# Patient Record
Sex: FEMALE | Race: WHITE | NOT HISPANIC OR LATINO | Employment: FULL TIME | URBAN - NONMETROPOLITAN AREA
[De-identification: names, ages, dates, MRNs, and addresses within clinical notes are randomized per-mention and may not be internally consistent; named-entity substitution may affect disease eponyms.]

---

## 2018-06-08 ENCOUNTER — OFFICE VISIT CONVERTED (OUTPATIENT)
Dept: FAMILY MEDICINE CLINIC | Age: 43
End: 2018-06-08
Attending: NURSE PRACTITIONER

## 2018-09-12 ENCOUNTER — OFFICE VISIT CONVERTED (OUTPATIENT)
Dept: FAMILY MEDICINE CLINIC | Age: 43
End: 2018-09-12
Attending: NURSE PRACTITIONER

## 2019-03-13 ENCOUNTER — OFFICE VISIT CONVERTED (OUTPATIENT)
Dept: FAMILY MEDICINE CLINIC | Age: 44
End: 2019-03-13
Attending: NURSE PRACTITIONER

## 2019-03-20 ENCOUNTER — HOSPITAL ENCOUNTER (OUTPATIENT)
Dept: OTHER | Facility: HOSPITAL | Age: 44
Discharge: HOME | End: 2019-03-20
Attending: NURSE PRACTITIONER

## 2019-03-20 LAB
25(OH)D3 SERPL-MCNC: 37.7 NG/ML (ref 30–100)
ALBUMIN SERPL-MCNC: 4.5 G/DL (ref 3.5–5)
ALBUMIN/GLOB SERPL: 1.2 {RATIO} (ref 1.4–2.6)
ALP SERPL-CCNC: 61 U/L (ref 42–98)
ALT SERPL-CCNC: 26 U/L (ref 10–40)
ANION GAP SERPL CALC-SCNC: 13 MMOL/L (ref 8–19)
AST SERPL-CCNC: 25 U/L (ref 15–50)
BILIRUB SERPL-MCNC: 1.14 MG/DL (ref 0.2–1.3)
BUN SERPL-MCNC: 14 MG/DL (ref 5–25)
BUN/CREAT SERPL: 17 {RATIO} (ref 6–20)
CALCIUM SERPL-MCNC: 9.9 MG/DL (ref 8.7–10.4)
CHLORIDE SERPL-SCNC: 100 MMOL/L (ref 99–111)
CHOLEST SERPL-MCNC: 159 MG/DL (ref 107–200)
CHOLEST/HDLC SERPL: 2.9 {RATIO} (ref 3–6)
CONV CO2: 27 MMOL/L (ref 22–32)
CONV TOTAL PROTEIN: 8.2 G/DL (ref 6.3–8.2)
CREAT UR-MCNC: 0.82 MG/DL (ref 0.5–0.9)
GFR SERPLBLD BASED ON 1.73 SQ M-ARVRAT: >60 ML/MIN/{1.73_M2}
GLOBULIN UR ELPH-MCNC: 3.7 G/DL (ref 2–3.5)
GLUCOSE SERPL-MCNC: 99 MG/DL (ref 65–99)
HDLC SERPL-MCNC: 54 MG/DL (ref 40–60)
LDLC SERPL CALC-MCNC: 90 MG/DL (ref 70–100)
OSMOLALITY SERPL CALC.SUM OF ELEC: 283 MOSM/KG (ref 273–304)
POTASSIUM SERPL-SCNC: 3.9 MMOL/L (ref 3.5–5.3)
SODIUM SERPL-SCNC: 136 MMOL/L (ref 135–147)
TRIGL SERPL-MCNC: 77 MG/DL (ref 40–150)
VLDLC SERPL-MCNC: 15 MG/DL (ref 5–37)

## 2019-05-15 ENCOUNTER — OFFICE VISIT CONVERTED (OUTPATIENT)
Dept: FAMILY MEDICINE CLINIC | Age: 44
End: 2019-05-15
Attending: NURSE PRACTITIONER

## 2019-05-15 ENCOUNTER — HOSPITAL ENCOUNTER (OUTPATIENT)
Dept: OTHER | Facility: HOSPITAL | Age: 44
Discharge: HOME | End: 2019-05-15
Attending: NURSE PRACTITIONER

## 2019-05-17 LAB
CONV LAST MENSTURAL PERIOD: NORMAL
HPV HYBRID CAPTURE HIGH RISK: NEGATIVE
SPECIMEN SOURCE: NORMAL
SPECIMEN SOURCE: NORMAL
THIN PREP CVX: NORMAL

## 2019-10-02 ENCOUNTER — OFFICE VISIT CONVERTED (OUTPATIENT)
Dept: FAMILY MEDICINE CLINIC | Age: 44
End: 2019-10-02
Attending: NURSE PRACTITIONER

## 2019-10-23 ENCOUNTER — HOSPITAL ENCOUNTER (OUTPATIENT)
Dept: OTHER | Facility: HOSPITAL | Age: 44
Discharge: HOME | End: 2019-10-23
Attending: NURSE PRACTITIONER

## 2019-10-23 LAB
ALBUMIN SERPL-MCNC: 4.7 G/DL (ref 3.5–5)
ALBUMIN/GLOB SERPL: 1.4 {RATIO} (ref 1.4–2.6)
ALP SERPL-CCNC: 63 U/L (ref 42–98)
ALT SERPL-CCNC: 27 U/L (ref 10–40)
ANION GAP SERPL CALC-SCNC: 21 MMOL/L (ref 8–19)
AST SERPL-CCNC: 25 U/L (ref 15–50)
BILIRUB SERPL-MCNC: 1.03 MG/DL (ref 0.2–1.3)
BUN SERPL-MCNC: 18 MG/DL (ref 5–25)
BUN/CREAT SERPL: 21 {RATIO} (ref 6–20)
CALCIUM SERPL-MCNC: 9.8 MG/DL (ref 8.7–10.4)
CHLORIDE SERPL-SCNC: 100 MMOL/L (ref 99–111)
CHOLEST SERPL-MCNC: 167 MG/DL (ref 107–200)
CHOLEST/HDLC SERPL: 2.8 {RATIO} (ref 3–6)
CONV CO2: 22 MMOL/L (ref 22–32)
CONV TOTAL PROTEIN: 8.1 G/DL (ref 6.3–8.2)
CREAT UR-MCNC: 0.84 MG/DL (ref 0.5–0.9)
GFR SERPLBLD BASED ON 1.73 SQ M-ARVRAT: >60 ML/MIN/{1.73_M2}
GLOBULIN UR ELPH-MCNC: 3.4 G/DL (ref 2–3.5)
GLUCOSE SERPL-MCNC: 96 MG/DL (ref 65–99)
HDLC SERPL-MCNC: 60 MG/DL (ref 40–60)
LDLC SERPL CALC-MCNC: 85 MG/DL (ref 70–100)
OSMOLALITY SERPL CALC.SUM OF ELEC: 290 MOSM/KG (ref 273–304)
POTASSIUM SERPL-SCNC: 3.6 MMOL/L (ref 3.5–5.3)
SODIUM SERPL-SCNC: 139 MMOL/L (ref 135–147)
TRIGL SERPL-MCNC: 111 MG/DL (ref 40–150)
VLDLC SERPL-MCNC: 22 MG/DL (ref 5–37)

## 2020-03-11 ENCOUNTER — OFFICE VISIT CONVERTED (OUTPATIENT)
Dept: FAMILY MEDICINE CLINIC | Age: 45
End: 2020-03-11
Attending: NURSE PRACTITIONER

## 2020-09-09 ENCOUNTER — OFFICE VISIT CONVERTED (OUTPATIENT)
Dept: FAMILY MEDICINE CLINIC | Age: 45
End: 2020-09-09
Attending: NURSE PRACTITIONER

## 2020-09-09 ENCOUNTER — HOSPITAL ENCOUNTER (OUTPATIENT)
Dept: OTHER | Facility: HOSPITAL | Age: 45
Discharge: HOME | End: 2020-09-09
Attending: NURSE PRACTITIONER

## 2020-09-09 LAB
ALBUMIN SERPL-MCNC: 4.7 G/DL (ref 3.5–5)
ALBUMIN/GLOB SERPL: 1.3 {RATIO} (ref 1.4–2.6)
ALP SERPL-CCNC: 60 U/L (ref 42–98)
ALT SERPL-CCNC: 20 U/L (ref 10–40)
ANION GAP SERPL CALC-SCNC: 18 MMOL/L (ref 8–19)
AST SERPL-CCNC: 22 U/L (ref 15–50)
BASOPHILS # BLD MANUAL: 0.04 10*3/UL (ref 0–0.2)
BASOPHILS NFR BLD MANUAL: 0.6 % (ref 0–3)
BILIRUB SERPL-MCNC: 0.86 MG/DL (ref 0.2–1.3)
BUN SERPL-MCNC: 14 MG/DL (ref 5–25)
BUN/CREAT SERPL: 17 {RATIO} (ref 6–20)
CALCIUM SERPL-MCNC: 10 MG/DL (ref 8.7–10.4)
CHLORIDE SERPL-SCNC: 98 MMOL/L (ref 99–111)
CHOLEST SERPL-MCNC: 178 MG/DL (ref 107–200)
CHOLEST/HDLC SERPL: 3.9 {RATIO} (ref 3–6)
CONV CO2: 24 MMOL/L (ref 22–32)
CONV TOTAL PROTEIN: 8.2 G/DL (ref 6.3–8.2)
CREAT UR-MCNC: 0.82 MG/DL (ref 0.5–0.9)
DEPRECATED RDW RBC AUTO: 42.1 FL
EOSINOPHIL # BLD MANUAL: 0.07 10*3/UL (ref 0–0.7)
EOSINOPHIL NFR BLD MANUAL: 1 % (ref 0–7)
ERYTHROCYTE [DISTWIDTH] IN BLOOD BY AUTOMATED COUNT: 12.7 % (ref 11.5–14.5)
GFR SERPLBLD BASED ON 1.73 SQ M-ARVRAT: >60 ML/MIN/{1.73_M2}
GLOBULIN UR ELPH-MCNC: 3.5 G/DL (ref 2–3.5)
GLUCOSE SERPL-MCNC: 103 MG/DL (ref 65–99)
GRANS (ABSOLUTE): 4.84 10*3/UL (ref 2–8)
GRANS: 71.2 % (ref 30–85)
HBA1C MFR BLD: 15.5 G/DL (ref 12–16)
HCT VFR BLD AUTO: 45.3 % (ref 37–47)
HDLC SERPL-MCNC: 46 MG/DL (ref 40–60)
IMM GRANULOCYTES # BLD: 0.01 10*3/UL (ref 0–0.54)
IMM GRANULOCYTES NFR BLD: 0.1 % (ref 0–0.43)
LDLC SERPL CALC-MCNC: 111 MG/DL (ref 70–100)
LYMPHOCYTES # BLD MANUAL: 1.5 10*3/UL (ref 1–5)
LYMPHOCYTES NFR BLD MANUAL: 5 % (ref 3–10)
MCH RBC QN AUTO: 30.8 PG (ref 27–31)
MCHC RBC AUTO-ENTMCNC: 34.2 G/DL (ref 33–37)
MCV RBC AUTO: 89.9 FL (ref 81–99)
MONOCYTES # BLD AUTO: 0.34 10*3/UL (ref 0.2–1.2)
OSMOLALITY SERPL CALC.SUM OF ELEC: 283 MOSM/KG (ref 273–304)
PLATELET # BLD AUTO: 261 10*3/UL (ref 130–400)
PMV BLD AUTO: 10.5 FL (ref 7.4–10.4)
POTASSIUM SERPL-SCNC: 4.1 MMOL/L (ref 3.5–5.3)
RBC # BLD AUTO: 5.04 10*6/UL (ref 4.2–5.4)
SODIUM SERPL-SCNC: 136 MMOL/L (ref 135–147)
TRIGL SERPL-MCNC: 106 MG/DL (ref 40–150)
TSH SERPL-ACNC: 0.96 M[IU]/L (ref 0.27–4.2)
VARIANT LYMPHS NFR BLD MANUAL: 22.1 % (ref 20–45)
VLDLC SERPL-MCNC: 21 MG/DL (ref 5–37)
WBC # BLD AUTO: 6.8 10*3/UL (ref 4.8–10.8)

## 2020-09-10 LAB — 25(OH)D3 SERPL-MCNC: 57.4 NG/ML (ref 30–100)

## 2020-10-21 ENCOUNTER — HOSPITAL ENCOUNTER (OUTPATIENT)
Dept: OTHER | Facility: HOSPITAL | Age: 45
Discharge: HOME | End: 2020-10-21

## 2020-11-20 ENCOUNTER — OFFICE VISIT CONVERTED (OUTPATIENT)
Dept: FAMILY MEDICINE CLINIC | Age: 45
End: 2020-11-20
Attending: NURSE PRACTITIONER

## 2020-12-16 ENCOUNTER — OFFICE VISIT CONVERTED (OUTPATIENT)
Dept: FAMILY MEDICINE CLINIC | Age: 45
End: 2020-12-16
Attending: NURSE PRACTITIONER

## 2021-05-18 NOTE — PROGRESS NOTES
"Carlota Saez  1975     Office/Outpatient Visit    Visit Date: Wed, Dec 16, 2020 11:29 am    Provider: Nydia Rodriguez N.P. (Assistant: Monik Garcia MA)    Location: [facility]        Electronically signed by Nydia Rodriguez N.P. on  12/16/2020 01:39:12 PM                             Subjective:        CC: Ms. Saez is a 45 year old White female.  Patient presents today with complaints of rash on bilateral arms X \"awhile\";         HPI:           Ms. Saez presents with rash and other nonspecific skin eruption.  televideo visit one month ago for rash that she suspected could have been MRSA.  completed doxycycline with minimal improvement.  rash itches and burns mildly.  located on bilateral extremties.  denies discharge or boils.  no new soaps or detergents.  is using scent booster in laundry.   does not have any rash.  afebrile.  denies joint pain.      ROS:     CONSTITUTIONAL:  Negative for chills, fatigue, fever, and weight change.      E/N/T:  Negative for hearing problems, E/N/T pain, congestion, rhinorrhea, epistaxis, hoarseness, and dental problems.      CARDIOVASCULAR:  Negative for chest pain, palpitations, tachycardia, orthopnea, and edema.      RESPIRATORY:  Negative for cough, dyspnea, and hemoptysis.      GASTROINTESTINAL:  Negative for abdominal pain, heartburn, constipation, diarrhea, and stool changes.      MUSCULOSKELETAL:  Negative for arthralgias, back pain, and myalgias.      INTEGUMENTARY/BREAST:  Positive for rash.      NEUROLOGICAL:  Negative for dizziness, headaches, paresthesias, and weakness.      ALLERGIC/IMMUNOLOGIC:  Negative for seasonal allergies.      PSYCHIATRIC:  Negative for anxiety, depression, and sleep disturbances.          Past Medical History / Family History / Social History:         Last Reviewed on 12/16/2020 11:38 AM by Nydia Rodriguez    Past Medical History:                 PAST MEDICAL HISTORY         Hypertension     titanium " clip in left breast, biopsy normal in the past         GYNECOLOGICAL HISTORY:        Problems with pregnancy have included dehydration.          CURRENT MEDICAL PROVIDERS:    Obstetrician/Gynecologist: Dr. Finnegan         PREVENTIVE HEALTH MAINTENANCE             BONE DENSITY: was last done 14 with normal results     MAMMOGRAM: Done within last 2 years and results in are chart was last done 10/21/2020 with normal results     PAP SMEAR: was last done 5/15/19 with normal results HPV normal         Surgical History:         oral surgeries;      section: X 1;         Family History:     Father: Medical history unknown     Mother: Cause of death was lung CA     Maternal great grandmother - Diabetes         Social History:     Occupation: [employer] in [employer]     Marital Status:      Children: 1 child         Tobacco/Alcohol/Supplements:     Last Reviewed on 2020 11:31 AM by Monik Garcia    Tobacco: Current Smoker: She currently smokes every day, 1 pack per day.          Substance Abuse History:     Last Reviewed on 2018 12:27 PM by Opal Paredes    None         Mental Health History:     Last Reviewed on 2018 12:27 PM by Opal Paredes    NEGATIVE         Communicable Diseases (eg STDs):     Last Reviewed on 2018 12:27 PM by Opal Paredes    Reportable health conditions; NEGATIVE         Current Problems:     Last Reviewed on 2020 11:37 AM by Nydia Rodriguez    Vitamin D deficiency, unspecified    Essential (primary) hypertension    Hyperlipidemia, unspecified    Obesity, unspecified    Nicotine dependence, unspecified, uncomplicated    Rash and other nonspecific skin eruption        Immunizations:     Tdap (Tetanus, reduced diph, acellular pertussis) 2018        Allergies:     Last Reviewed on 2020 11:31 AM by Monik Garcia    Penicillins: Unknown Reactions         Current Medications:     Last Reviewed on 2020 11:31 AM by  Monik Garcia    lisinopriL-hydrochlorothiazide 20-25 mg oral tablet [TAKE 1 TABLET(S) BY MOUTH DAILY]    atorvastatin 10 mg oral tablet [1 tab daily]    cholecalciferol (vitamin D3) 100 mcg (4,000 unit) oral capsule [Take 1 capsule(s) by mouth daily]    OTC Calcium 600mg w/ Vitamin D3 800IU Take one tablet bid      mupirocin 2 % Topical Ointment [apply a small amount to the affected area by topical route 2 times per day]    DOXYCYCLINE HYCLATE 100 MG CAP        Objective:        Vitals:         Historical:     9/9/2020  BP:   130/74 mm Hg ( (left arm, , sitting, );) 9/9/2020  Wt:   272.4lbs    Current: 12/16/2020 11:33:03 AM    Ht:  5 ft, 4 in;  Wt: 269 lbs;  BMI: 46.2T: 97 F (temporal);  BP: 144/81 mm Hg (left arm, sitting);  P: 68 bpm (left arm (BP Cuff), sitting);  sCr: 0.82 mg/dL;  GFR: 108.66        Exams:     PHYSICAL EXAM:     GENERAL: obese;  no apparent distress;     E/N/T: EARS: bilateral TMs are normal;  OROPHARYNX: posterior pharynx, including tonsils, tongue, and uvula are normal;     RESPIRATORY: normal respiratory rate and pattern with no distress; normal breath sounds with no rales, rhonchi, wheezes or rubs;     CARDIOVASCULAR: normal rate; rhythm is regular;     BREAST/INTEGUMENT: a rash is noted on the upper extremities;  the color is mainly red;  it is best characterized as maculopapular;     MUSCULOSKELETAL:  Normal range of motion, strength and tone;     NEUROLOGIC: mental status: alert and oriented x 3; GROSSLY INTACT     PSYCHIATRIC:  appropriate affect and demeanor; normal speech pattern; grossly normal memory;         Assessment:         R21   Rash and other nonspecific skin eruption           ORDERS:         Meds Prescribed:       [New Rx] predniSONE 5 mg oral tablet [take 8 tablets on day one, 6 tablets on day 2, 4 tablets on day 3, 2 tablets on day 4 and 1 tablet on day 5], #21 (twenty one) tablets, Refills: 0 (zero)                 Plan:         Rash and other nonspecific skin  "eruption        RECOMMENDATIONS given include: avoid irritants (such as wool clothing, synthetics, etc.) and stop use of scent booster in laundry.  take zyrtec daily until rash resolves.  consider referral to dermatology if not improving..            Prescriptions:       [New Rx] predniSONE 5 mg oral tablet [take 8 tablets on day one, 6 tablets on day 2, 4 tablets on day 3, 2 tablets on day 4 and 1 tablet on day 5], #21 (twenty one) tablets, Refills: 0 (zero)             Patient Recommendations:        For  Rash and other nonspecific skin eruption:        Avoid irritants such as wool clothing or synthetics; some \"no iron\" garments contain formaldehyde residue which may aggravate skin irritation.              Charge Capture:         Primary Diagnosis:     R21  Rash and other nonspecific skin eruption           Orders:      42258  Office/outpatient visit; established patient, level 3  (In-House)                     "

## 2021-05-18 NOTE — PROGRESS NOTES
"Carlota Saez  1975     Office/Outpatient Visit    Visit Date: Wed, Sep 9, 2020 11:54 am    Provider: Opal Paredes N.P. (Assistant: Monik Garcia MA)    Location: [facility]        Electronically signed by Opal Paredes N.P. on  2020 12:18:57 PM                             Subjective:        CC:     HPI: Patient to be evaluated for hypertension.  Her current cardiac medication regimen includes Lisinopril/HCTZ.  Ms. Saez does not check her blood pressure other than at her clinic appointments.  Compliance with treatment has been good; she takes her medication as directed and follows up as directed.          Dx with hyperlipidemia; current treatment includes Atorvastatin.          Hx Vitamin D deficiency. Taking supplement daily. Noticed improvement in fatigue since she started.    ROS:     CONSTITUTIONAL:  Negative for chills and fever.      EYES:  Negative for blurred vision and eye pain.      CARDIOVASCULAR:  Negative for chest pain and palpitations.      RESPIRATORY:  Negative for dyspnea and frequent wheezing.      NEUROLOGICAL:  Negative for dizziness and headaches.      PSYCHIATRIC:  Negative for depression and suicidal thoughts.          Past Medical History / Family History / Social History:         Last Reviewed on 3/11/2020 12:28 PM by Opal Paredes    Past Medical History:                 PAST MEDICAL HISTORY         Hypertension     titanium clip in left breast, biopsy normal in the past         GYNECOLOGICAL HISTORY:        Problems with pregnancy have included dehydration.          CURRENT MEDICAL PROVIDERS:    Obstetrician/Gynecologist: Dr. Finnegan         PREVENTIVE HEALTH MAINTENANCE             BONE DENSITY: was last done 14 with normal results     DENTAL CLEANING: was last done      EYE EXAM: was last done 'couple of years ago\"     MAMMOGRAM: Done within last 2 years and results in are chart was last done 19 with normal results     PAP SMEAR: " was last done 5/15/19 with normal results HPV normal         Surgical History:         oral surgeries;      section: X 1;         Family History:     Father: Medical history unknown     Mother: Cause of death was lung CA     Maternal great grandmother - Diabetes         Social History:     Occupation: [employer] in [employer]     Marital Status:      Children: 1 child         Tobacco/Alcohol/Supplements:     Last Reviewed on 3/11/2020 12:17 PM by Rosita Moss    Tobacco: Current Smoker: She currently smokes every day, E-Cigarette.          Substance Abuse History:     Last Reviewed on 2018 12:27 PM by Opal Paredes    None         Mental Health History:     Last Reviewed on 2018 12:27 PM by Opal Paredes    NEGATIVE         Communicable Diseases (eg STDs):     Last Reviewed on 2018 12:27 PM by Opal Paredes    Reportable health conditions; NEGATIVE         Current Problems:     Last Reviewed on 2018 12:27 PM by Opal Paredes    Vitamin D deficiency, unspecified    Essential (primary) hypertension    Hyperlipidemia, unspecified    Obesity, unspecified    Nicotine dependence, unspecified, uncomplicated    Encounter for general adult medical examination without abnormal findings    Encounter for screening for depression    Encounter for screening mammogram for malignant neoplasm of breast        Immunizations:     Tdap (Tetanus, reduced diph, acellular pertussis) 2018        Allergies:     Last Reviewed on 2020 11:56 AM by Monik Garcia    Penicillins: Unknown Reactions         Current Medications:     Last Reviewed on 2020 11:56 AM by Monik Garcia    lisinopriL-hydrochlorothiazide 20-25 mg oral tablet [TAKE 1 TABLET(S) BY MOUTH DAILY]    atorvastatin 10 mg oral tablet [1 tab daily]    cholecalciferol (vitamin D3) 4,000 unit oral capsule [Take 1 capsule(s) by mouth daily]    OTC Calcium 600mg w/ Vitamin D3 800IU Take one tablet bid           Objective:        Vitals:         Historical:     3/11/2020  BP:   137/73 mm Hg ( (left arm, , sitting, );) 3/11/2020  P:   68bpm ( (left arm (BP Cuff), , sitting, );) 3/11/2020  Wt:   283.4lbs    Current: 9/9/2020 11:58:21 AM    Ht:  5 ft, 4 in;  Wt: 272.4 lbs;  BMI: 46.8T: 97.1 F (temporal);  BP: 130/74 mm Hg (left arm, sitting);  P: 73 bpm (left arm (BP Cuff), sitting);  sCr: 0.84 mg/dL;  GFR: 106.64        Exams:     PHYSICAL EXAM:     GENERAL: well developed, well nourished;  no apparent distress;     RESPIRATORY: normal respiratory rate and pattern with no distress; normal breath sounds with no rales, rhonchi, wheezes or rubs;     CARDIOVASCULAR: normal rate; rhythm is regular;     MUSCULOSKELETAL: normal gait;     NEUROLOGIC: mental status: alert and oriented x 3; GROSSLY INTACT     PSYCHIATRIC: appropriate affect and demeanor;         Assessment:         I10   Essential (primary) hypertension       E78.5   Hyperlipidemia, unspecified       E55.9   Vitamin D deficiency, unspecified       Z13.31   Encounter for screening for depression       F17.200   Nicotine dependence, unspecified, uncomplicated           ORDERS:         Meds Prescribed:       [Refilled] lisinopriL-hydrochlorothiazide 20-25 mg oral tablet [TAKE 1 TABLET(S) BY MOUTH DAILY], #90 (ninety) tablets, Refills: 1 (one)       [Refilled] atorvastatin 10 mg oral tablet [1 tab daily], #90 (ninety) tablets, Refills: 1 (one)       [Refilled] cholecalciferol (vitamin D3) 100 mcg (4,000 unit) oral capsule [Take 1 capsule(s) by mouth daily], #90 (ninety) capsules, Refills: 1 (one)         Lab Orders:       APPTO  Appointment need  (In-House)              Other Orders:         Depression screen negative  (In-House)            4004F  Pt scrnd tobacco use rcvd tobacco cessation talk  (In-House)                      Plan: Has mammogram scheduled. Will print out lab work from last visit for her to complete today.        Essential (primary)  hypertensionStable. Continue efforts with healthy diet and exercise.         FOLLOW-UP: Schedule a follow-up visit in 6 months.            Prescriptions:       [Refilled] lisinopriL-hydrochlorothiazide 20-25 mg oral tablet [TAKE 1 TABLET(S) BY MOUTH DAILY], #90 (ninety) tablets, Refills: 1 (one)           Orders:       APPTO  Appointment need  (In-House)              Hyperlipidemia, unspecifiedChecking labs as above          Prescriptions:       [Refilled] atorvastatin 10 mg oral tablet [1 tab daily], #90 (ninety) tablets, Refills: 1 (one)         Vitamin D deficiency, unspecified          Prescriptions:       [Refilled] cholecalciferol (vitamin D3) 100 mcg (4,000 unit) oral capsule [Take 1 capsule(s) by mouth daily], #90 (ninety) capsules, Refills: 1 (one)         Encounter for screening for depression    MIPS PHQ-9 Depression Screening: Completed form scanned and in chart; Total Score 0; Negative Depression Screen           Orders:         Depression screen negative  (In-House)              Nicotine dependence, unspecified, uncomplicated        RECOMMENDATIONS given include: Counseled on smoking cessation and advised of the benefits to patient's health if she were to stop smoking. Counseling for less than 3 minutes.            Orders:       4004F  Pt scrnd tobacco use rcvd tobacco cessation talk  (In-House)                  Patient Recommendations:        For  Essential (primary) hypertension:    Schedule a follow-up visit in 6 months.          For  Nicotine dependence, unspecified, uncomplicated:        Stop smoking.              Charge Capture:         Primary Diagnosis:     I10  Essential (primary) hypertension           Orders:      18157  Office/outpatient visit; established patient, level 4  (In-House)            APPTO  Appointment need  (In-House)              E78.5  Hyperlipidemia, unspecified     E55.9  Vitamin D deficiency, unspecified     Z13.31  Encounter for screening for depression            Orders:        Depression screen negative  (In-House)              F17.200  Nicotine dependence, unspecified, uncomplicated           Orders:      4004F  Pt scrnd tobacco use rcvd tobacco cessation talk  (In-House)

## 2021-05-18 NOTE — PROGRESS NOTES
Carlota Saez 1975     Office/Outpatient Visit    Visit Date: Wed, Sep 12, 2018 12:12 pm    Provider: Opal Paredes N.P. (Assistant: Areli Royal MA)    Location: [facility]        Electronically signed by Opal Paredes N.P. on  2018 01:05:40 PM                             SUBJECTIVE:        CC:     Ms. Saez is a 43 year old White female.  This is a follow-up visit.          HPI:         Ms. Saez presents with hypertension.  Her current cardiac medication regimen includes Lisinopril/HCTZ.  Ms. Saez does not check her blood pressure other than at her clinic appointments.  Compliance with treatment has been good; she takes her medication as directed and follows up as directed.          With regard to the hyperlipidemia, most recent lab tests include LDL:  158 (mg/dL) (2018), Total Cholesterol:  230 (mg/dL) (2018), Triglycerides:  76 (mg/dL) (2018).  Currently, she is taking Lipitor.      She is taking OTC medication with calcium and 800 IU of Vitamin D3. Has noticed improvement in fatigue since starting this.     ROS:     CONSTITUTIONAL:  Negative for chills and fever.      EYES:  Negative for eye drainage and eye pain.      E/N/T:  Negative for ear pain and sore throat.      CARDIOVASCULAR:  Negative for chest pain and palpitations.      RESPIRATORY:  Negative for dyspnea and frequent wheezing.      GASTROINTESTINAL:  Negative for abdominal pain and vomiting.      NEUROLOGICAL:  Negative for dizziness and headaches.      PSYCHIATRIC:  Negative for depression and suicidal thoughts.          PMH/FMH/SH:     Last Reviewed on 2018 12:27 PM by Opal Paredes    Past Medical History:                 PAST MEDICAL HISTORY         Hypertension     titanium clip in left breast, biopsy normal in the past         GYNECOLOGICAL HISTORY:        Problems with pregnancy have included dehydration.          CURRENT MEDICAL PROVIDERS:    Obstetrician/Gynecologist: Dr. Finnegan          PREVENTIVE HEALTH MAINTENANCE             BONE DENSITY: was last done 14 with normal results     EYE EXAM: was last done 2017 Sandip     MAMMOGRAM: Done within last 2 years and results in are chart was last done 17 with normal results     PAP SMEAR: was last done  with normal results         Surgical History:         oral surgeries;       section: X 1;         Family History:     Father: Medical history unknown     Mother: Cause of death was lung CA         Social History:     Occupation: [employer]     Marital Status:      Children: 1 child         Tobacco/Alcohol/Supplements:     Last Reviewed on 2018 12:27 PM by Opal Paredes    Tobacco: Current Smoker: She currently smokes every day, E-Cigarette.   Currently uses smokeless tobacco.          Substance Abuse History:     Last Reviewed on 2018 12:27 PM by Opal Paredes    None         Mental Health History:     Last Reviewed on 2018 12:27 PM by Opal Paredes    NEGATIVE         Communicable Diseases (eg STDs):     Last Reviewed on 2018 12:27 PM by Opal Paredes    Reportable health conditions; NEGATIVE             Current Problems:     Last Reviewed on 2018 12:27 PM by Opal Paredes    Hyperlipidemia     Vitamin D deficiency, unspecified     Hypertension         Immunizations:     None        Allergies:     Last Reviewed on 2018 12:27 PM by Opal Paredes    Penicillins: Unknown Reactions        Current Medications:     Last Reviewed on 2018 12:27 PM by Opal Paredes    Atorvastatin Calcium 10mg Tablet 1 tab daily     Lisinopril/Hydrochlorothiazide 20mg/25mg Tablet Take 1 tablet(s) by mouth daily     OTC Calcium 600mg w/ Vitamin D3 800IU Take one tablet bid         OBJECTIVE:        Vitals:         Historical:     2018  BP:   117/78 mm Hg ( (left arm, , sitting, );)     2018  Wt:   278.8lbs        Current: 2018 12:17:07 PM    Ht:  5 ft, 4 in;  Wt:  283.6 lbs;  BMI: 48.7    T: 98.3 F (oral);  BP: 128/66 mm Hg (left arm, sitting);  P: 65 bpm (left arm (BP Cuff), sitting);  sCr: 0.84 mg/dL;  GFR: 110.73        Exams:     PHYSICAL EXAM:     GENERAL: well developed, well nourished;  no apparent distress;     RESPIRATORY: normal respiratory rate and pattern with no distress; normal breath sounds with no rales, rhonchi, wheezes or rubs;     CARDIOVASCULAR: normal rate; rhythm is regular;     GASTROINTESTINAL: nontender; normal bowel sounds; no organomegaly;     MUSCULOSKELETAL: normal gait;     NEUROLOGIC: mental status: alert and oriented x 3; GROSSLY INTACT     PSYCHIATRIC: appropriate affect and demeanor;         Procedures:     Tdap vaccination     1. Tdap: 0.5 ml unit dose given IM in the left upper arm; administered by AS;  lot number [id number]; expires 10/19/2020             ASSESSMENT           401.1   I10  Hypertension              DDx:     272.4   E78.2  Hyperlipidemia              DDx:     268.9   E55.9  Vitamin D deficiency, unspecified              DDx:     V06.1   Z23  Tdap vaccination              DDx:     305.1   F17.210  Tobacco abuse              DDx:         ORDERS:         Meds Prescribed:       Refill of: Lisinopril/Hydrochlorothiazide 20mg/25mg Tablet Take 1 tablet(s) by mouth daily  #30 (Thirty) tablet(s) Refills: 5       Refill of: Atorvastatin Calcium 10mg Tablet 1 tab daily  #30 (Thirty) tablet(s) Refills: 5         Lab Orders:       04931  HTNLP - [facility] CMP AND LIPID: [id number], [id number]  (Send-Out)         26906  VITD - [facility] Vitamin D, 25 Hydroxy  (Send-Out)         APPTO  Appointment need  (In-House)           Procedures Ordered:       45049  Tdap vaccine, when administered to individuals 7 years or older, for intramuscular use  (In-House)         03104  Immunization administration; one vaccine  (In-House)           Other Orders:       4004F  Pt scrnd tobacco use rcvd tobacco cessation talk  (In-House)                   PLAN: Will follow up with   [facility] office for mammogram and pap smear.          Hypertension     LABORATORY:  Labs ordered to be performed today include HTN/Lipid Panel: CMP, Lipid.      RECOMMENDATIONS given include: keep blood pressure log as directed and eat a low salt diet.      FOLLOW-UP: Schedule a follow-up visit in 6 months.            Prescriptions:       Refill of: Lisinopril/Hydrochlorothiazide 20mg/25mg Tablet Take 1 tablet(s) by mouth daily  #30 (Thirty) tablet(s) Refills: 5           Orders:       53949  HTNLP - [facility] CMP AND LIPID: [id number], [id number]  (Send-Out)         APPTO  Appointment need  (In-House)             Patient Education Handouts:       DASH Diet        High Blood Pressure (HTN)           Hyperlipidemia         RECOMMENDATIONS given include: Further recommendation to be given after test results are complete.            Prescriptions:       Refill of: Atorvastatin Calcium 10mg Tablet 1 tab daily  #30 (Thirty) tablet(s) Refills: 5           Patient Education Handouts:       Hyperlipidemia (Hyperlipoproteinemia)           Vitamin D deficiency, unspecified Continue OTC supplement. Checking labs today.     LABORATORY:  Labs ordered to be performed today include Vitamin D.            Orders:       75209  VITD - [facility] Vitamin D, 25 Hydroxy  (Send-Out)            Tdap vaccination         IMMUNIZATIONS given today: TDAP.            Orders:       83202  Tdap vaccine, when administered to individuals 7 years or older, for intramuscular use  (In-House)         66872  Immunization administration; one vaccine  (In-House)            Tobacco abuse         RECOMMENDATIONS given include: Counseled on smoking cessation and advised of the benefits to patient's health if she were to stop smoking. Counseling for less than 3 minutes.            Orders:       4004F  Pt scrnd tobacco use rcvd tobacco cessation talk  (In-House)               Patient Recommendations:        For  Hypertension:     Keep a daily blood pressure log and report  elevated blood pressure to provider as directed.  Schedule a follow-up visit in 6 months.          For  Tobacco abuse:         Stop smoking.              CHARGE CAPTURE           **Please note: ICD descriptions below are intended for billing purposes only and may not represent clinical diagnoses**        Primary Diagnosis:         401.1 Hypertension            I10    Essential (primary) hypertension              Orders:          91819   Office/outpatient visit; established patient, level 4  (In-House)             APPTO   Appointment need  (In-House)           272.4 Hyperlipidemia            E78.2    Mixed hyperlipidemia    268.9 Vitamin D deficiency, unspecified            E55.9    Vitamin D deficiency, unspecified    V06.1 Tdap vaccination            Z23    Encounter for immunization              Orders:          42808   Tdap vaccine, when administered to individuals 7 years or older, for intramuscular use  (In-House)             88563   Immunization administration; one vaccine  (In-House)           305.1 Tobacco abuse            F17.210    Nicotine dependence, cigarettes, uncomplicated              Orders:          4004F   Pt scrnd tobacco use rcvd tobacco cessation talk  (In-House)

## 2021-05-18 NOTE — PROGRESS NOTES
Carlota Saez 1975     Office/Outpatient Visit    Visit Date: Wed, Mar 13, 2019 12:15 pm    Provider: Opal Paredes N.P. (Assistant: Monik Garcia MA)    Location: [facility]        Electronically signed by Opal Paredes N.P. on  2019 12:51:59 PM                             SUBJECTIVE:        CC:     Ms. Saez is a 43 year old White female.  This is a follow-up visit.  Patient presents today for six month follow up;         HPI:         Patient to be evaluated for hypertension.  Her current cardiac medication regimen includes Lisinopril/HCTZ.  Ms. Saez does not check her blood pressure other than at her clinic appointments.  Has not taken medication yet today.          Dx with hyperlipidemia; current treatment includes Lipitor.  Most recent lab tests include HDL:  54 (mg/dL) (2018), LDL:  86 (mg/dL) (2018), Total Cholesterol:  166 (mg/dL) (2018), Triglycerides:  129 (mg/dL) (2018).      Hx Vitamin D deficiency. Taking supplement daily. Has noticed improvement in fatigue level since started taking.     ROS:     CONSTITUTIONAL:  Negative for chills and fever.      CARDIOVASCULAR:  Negative for chest pain and palpitations.      RESPIRATORY:  Negative for dyspnea and frequent wheezing.      GASTROINTESTINAL:  Negative for abdominal pain and vomiting.      NEUROLOGICAL:  Negative for dizziness and headaches.      PSYCHIATRIC:  Negative for depression and suicidal thoughts.          PMH/FMH/SH:     Last Reviewed on 2018 12:27 PM by Opal Paredes    Past Medical History:                 PAST MEDICAL HISTORY         Hypertension     titanium clip in left breast, biopsy normal in the past         GYNECOLOGICAL HISTORY:        Problems with pregnancy have included dehydration.          CURRENT MEDICAL PROVIDERS:    Obstetrician/Gynecologist: Dr. Finnegan         PREVENTIVE HEALTH MAINTENANCE             BONE DENSITY: was last done 14 with normal results      EYE EXAM: was last done  Sandip     MAMMOGRAM: Done within last 2 years and results in are chart was last done 17 with normal results     PAP SMEAR: was last done  with normal results         Surgical History:         oral surgeries;       section: X 1;         Family History:     Father: Medical history unknown     Mother: Cause of death was lung CA         Social History:     Occupation: [employer] in [place]     Marital Status:      Children: 1 child         Tobacco/Alcohol/Supplements:     Last Reviewed on 2018 12:27 PM by Opal Paredes    Tobacco: Current Smoker: She currently smokes every day, E-Cigarette.   Currently uses smokeless tobacco.          Substance Abuse History:     Last Reviewed on 2018 12:27 PM by Opal Paredes    None         Mental Health History:     Last Reviewed on 2018 12:27 PM by Opal Paredes    NEGATIVE         Communicable Diseases (eg STDs):     Last Reviewed on 2018 12:27 PM by Opal Paredes    Reportable health conditions; NEGATIVE             Current Problems:     Last Reviewed on 2018 12:27 PM by Opal Paredes    Hyperlipidemia     Vitamin D deficiency, unspecified     Hypertension         Immunizations:     Tdap (Tetanus, reduced diph, acellular pertussis) 2018         Allergies:     Last Reviewed on 2018 12:27 PM by Opal Paredse    Penicillins: Unknown Reactions        Current Medications:     Last Reviewed on 2018 12:27 PM by Opal Paredes    Vitamin D3 2,000IU Capsules 1 capsule daily     Atorvastatin Calcium 10mg Tablet 1 tab daily     Lisinopril/Hydrochlorothiazide 20mg/25mg Tablet Take 1 tablet(s) by mouth daily     OTC Calcium 600mg w/ Vitamin D3 800IU Take one tablet bid         OBJECTIVE:        Vitals:         Historical:     2018  Wt:   283.6lbs        Current: 3/13/2019 12:19:20 PM    Ht:  5 ft, 4 in;  Wt: 291.8 lbs;  BMI: 50.1    T: 97.6 F (oral);  BP: 136/72 mm Hg (left arm,  sitting);  P: 68 bpm (left arm (BP Cuff), sitting);  sCr: 0.98 mg/dL;  GFR: 96.07        Exams:     PHYSICAL EXAM:     GENERAL: well developed, well nourished;  no apparent distress;     RESPIRATORY: normal respiratory rate and pattern with no distress; normal breath sounds with no rales, rhonchi, wheezes or rubs;     CARDIOVASCULAR: normal rate; rhythm is regular;     MUSCULOSKELETAL: normal gait;     NEUROLOGIC: mental status: alert and oriented x 3; GROSSLY INTACT     PSYCHIATRIC: appropriate affect and demeanor;         ASSESSMENT           401.1   I10  Hypertension              DDx:     272.4   E78.5  Hyperlipidemia              DDx:     268.9   E55.9  Vitamin D deficiency, unspecified              DDx:     V76.10   Z12.39  Screening for breast cancer, unspecified              DDx:     278.00   E66.9  Obesity, unspecified              DDx:     305.1   F17.200  Cigarette smoking              DDx:         ORDERS:         Meds Prescribed:       Refill of: Lisinopril/Hydrochlorothiazide 20mg/25mg Tablet Take 1 tablet(s) by mouth daily  #30 (Thirty) tablet(s) Refills: 5       Refill of: Atorvastatin Calcium 10mg Tablet 1 tab daily  #30 (Thirty) tablet(s) Refills: 5       Refill of: Vitamin D3 (Cholecalciferol Vitamin D3) 2,000IU Capsules 1 capsule daily  #30 (Thirty) capsule(s) Refills: 5         Radiology/Test Orders:       [id number]  Screening mammography bi 2-view inc CAD  (Send-Out)           Lab Orders:       [id number]  VITD - H Vitamin D, 25 Hydroxy  (Send-Out)         03192  HTNLP - H CMP AND LIPID: [id number], [id number]  (Send-Out)         APPTO  Appointment need  (In-House)           Other Orders:         Calculated BMI above the upper parameter and a follow-up plan was documented in the medical record  (In-House)         4004F  Pt scrnd tobacco use rcvd tobacco cessation talk  (In-House)                   PLAN: Due for mammogram and pap smear and eye exam.          Hypertension     LABORATORY:  Labs ordered to be  performed today include HTN/Lipid Panel: CMP, Lipid.      RECOMMENDATIONS given include: keep blood pressure log as directed and eat a low salt diet.  MIPS Vaccines Flu and Pneumonia updated in Shot record     FOLLOW-UP: Schedule follow-up appointments on a p.r.n. basis. Schedule a follow-up visit in 6 months. for Well Woman Exam           Prescriptions:       Refill of: Lisinopril/Hydrochlorothiazide 20mg/25mg Tablet Take 1 tablet(s) by mouth daily  #30 (Thirty) tablet(s) Refills: 5           Orders:       [id number]  HTNLP - [facility] CMP AND LIPID: [id number], [id number]  (Send-Out)         APPTO  Appointment need  (In-House)            Hyperlipidemia Low cholesterol diet. Regular exercise.           Prescriptions:       Refill of: Atorvastatin Calcium 10mg Tablet 1 tab daily  #30 (Thirty) tablet(s) Refills: 5          Vitamin D deficiency, unspecified     LABORATORY:  Labs ordered to be performed today include Vitamin D.      RECOMMENDATIONS given include: Further recommendation to be given after test results are complete.            Prescriptions:       Refill of: Vitamin D3 (Cholecalciferol Vitamin D3) 2,000IU Capsules 1 capsule daily  #30 (Thirty) capsule(s) Refills: 5           Orders:       33036  VITD - [facility] Vitamin D, 25 Hydroxy  (Send-Out)            Screening for breast cancer, unspecified         RADIOLOGY:  I have ordered screening mammogram to be done today.            Orders:       [id number]  Screening mammography bi 2-view inc CAD  (Send-Out)            Obesity, unspecified     MIPS     BMI Elevated - Follow-Up Plan: She was provided education on weight loss strategies           Orders:         Calculated BMI above the upper parameter and a follow-up plan was documented in the medical record  (In-House)            Cigarette smoking         RECOMMENDATIONS given include: Counseled on smoking cessation and advised of the benefits to patient's health if she were to stop smoking. Counseling for less than 3 minutes.             Orders:       4004F  Pt scrnd tobacco use rcvd tobacco cessation talk  (In-House)               Patient Recommendations:        For  Hypertension:     Keep a daily blood pressure log and report elevated blood pressure to provider as directed.  Schedule follow-up appointments as needed. Schedule a follow-up visit in 6 months.          For  Cigarette smoking:         Stop smoking.              CHARGE CAPTURE           **Please note: ICD descriptions below are intended for billing purposes only and may not represent clinical diagnoses**        Primary Diagnosis:         401.1 Hypertension            I10    Essential (primary) hypertension              Orders:          90078   Office/outpatient visit; established patient, level 4  (In-House)             APPTO   Appointment need  (In-House)           272.4 Hyperlipidemia            E78.5    Hyperlipidemia, unspecified    268.9 Vitamin D deficiency, unspecified            E55.9    Vitamin D deficiency, unspecified    V76.10 Screening for breast cancer, unspecified            Z12.39    Encounter for other screening for malignant neoplasm of breast    278.00 Obesity, unspecified            E66.9    Obesity, unspecified              Orders:             Calculated BMI above the upper parameter and a follow-up plan was documented in the medical record  (In-House)           305.1 Cigarette smoking            F17.200    Nicotine dependence, unspecified, uncomplicated              Orders:          4004F   Pt scrnd tobacco use rcvd tobacco cessation talk  (In-House)

## 2021-05-18 NOTE — PROGRESS NOTES
Carlota Saez  1975     Office/Outpatient Visit    Visit Date: Wed, Mar 11, 2020 12:14 pm    Provider: Opal Paredes N.P. (Assistant: Rosita Moss RN)    Location: [facility]        Electronically signed by Opal Paredes N.P. on  03/11/2020 12:55:57 PM                             Subjective:        CC: Ms. Saez is a 44 year old White female.  Preventative Exam;         HPI:           Patient to be evaluated for encounter for general adult medical examination without abnormal findings.  She is menopausal.   Her last Pap smear was in 5/15/19 and was normal.   Her last mammogram was in 4/24/19 and was normal.   Her last DEXA was in 11/24/14 and was normal.   Preventative Health updated today.  She is current with her Td.  She is not current with influenza immunization.  Tobacco: Current Smoker: She currently smokes every day, E-Cigarette.  Patient to be evaluated for hypertension.  Her current cardiac medication regimen includes Lisinopril/HCTZ.  Ms. Saez does not check her blood pressure other than at her clinic appointments.  Compliance with treatment has been good; she takes her medication as directed and follows up as directed.          Dx with hyperlipidemia; current treatment includes Atorvastatin.          Hx Vitamin D deficiency. Taking supplement daily. Has noticed improvement in fatigue level since started taking.    ROS:     CONSTITUTIONAL:  Negative for chills and fever.      EYES:  Negative for eye drainage and eye pain.      E/N/T:  Negative for ear pain and sore throat.      CARDIOVASCULAR:  Negative for chest pain and palpitations.      RESPIRATORY:  Negative for dyspnea and frequent wheezing.      GASTROINTESTINAL:  Negative for abdominal pain and vomiting.      GENITOURINARY:  Negative for dysuria and frequent urination.      MUSCULOSKELETAL:  Negative for joint stiffness and myalgias.      INTEGUMENTARY/BREAST:  Negative for rash.      NEUROLOGICAL:  Negative for  "dizziness and headaches.      PSYCHIATRIC:  Negative for depression and suicidal thoughts.          Past Medical History / Family History / Social History:         Last Reviewed on 3/11/2020 12:28 PM by Opal Paredes    Past Medical History:                 PAST MEDICAL HISTORY         Hypertension     titanium clip in left breast, biopsy normal in the past         GYNECOLOGICAL HISTORY:        Problems with pregnancy have included dehydration.          CURRENT MEDICAL PROVIDERS:    Obstetrician/Gynecologist: Dr. Finnegan         PREVENTIVE HEALTH MAINTENANCE             BONE DENSITY: was last done 14 with normal results     DENTAL CLEANING: was last done      EYE EXAM: was last done 'couple of years ago\"     MAMMOGRAM: Done within last 2 years and results in are chart was last done 19 with normal results     PAP SMEAR: was last done 5/15/19 with normal results HPV normal         Surgical History:         oral surgeries;      section: X 1;         Family History:     Father: Medical history unknown     Mother: Cause of death was lung CA     Maternal great grandmother - Diabetes         Social History:     Occupation: [employer] in [employer]     Marital Status:      Children: 1 child         Tobacco/Alcohol/Supplements:     Last Reviewed on 3/11/2020 12:17 PM by Rosita Moss    Tobacco: Current Smoker: She currently smokes every day, E-Cigarette.          Substance Abuse History:     Last Reviewed on 2018 12:27 PM by Opal Paredes    None         Mental Health History:     Last Reviewed on 2018 12:27 PM by Opal Paredes    NEGATIVE         Communicable Diseases (eg STDs):     Last Reviewed on 2018 12:27 PM by Opal Paredes    Reportable health conditions; NEGATIVE         Current Problems:     Last Reviewed on 2018 12:27 PM by Opal Paredes    Vitamin D deficiency, unspecified    Essential (primary) hypertension    Hyperlipidemia, unspecified    " Obesity, unspecified    Nicotine dependence, unspecified, uncomplicated    Encounter for general adult medical examination without abnormal findings        Immunizations:     Tdap (Tetanus, reduced diph, acellular pertussis) 9/12/2018        Allergies:     Last Reviewed on 3/11/2020 12:14 PM by Rosita Moss    Penicillins: Unknown Reactions         Current Medications:     Last Reviewed on 3/11/2020 12:14 PM by Rosita Moss    Lisinopril/Hydrochlorothiazide 20mg/25mg Tablet [Take 1 tablet(s) by mouth daily]    Atorvastatin Calcium 10mg Tablet [1 tab daily]    Vitamin D3 4,000IU Capsules [Take 1 capsule(s) by mouth daily]    OTC Calcium 600mg w/ Vitamin D3 800IU Take one tablet bid         Objective:        Vitals:         Historical:     10/2/2019  BP:   137/71 mm Hg ( (right arm, , sitting, );) 10/2/2019  P:   70bpm ( (right arm (BP Cuff), , sitting, );) 10/2/2019  Wt:   290.4lbs    Current: 3/11/2020 12:19:57 PM    Ht:  5 ft, 4 in;  Wt: 283.4 lbs;  BMI: 48.6T: 98.7 F (oral);  BP: 137/73 mm Hg (left arm, sitting);  P: 68 bpm (left arm (BP Cuff), sitting);  sCr: 0.84 mg/dL;  GFR: 109.58        Exams:     PHYSICAL EXAM:     GENERAL: well developed, well nourished;  no apparent distress;     EYES: PERRL, EOMI     E/N/T: EARS: external auditory canal normal;  bilateral TMs are normal;  NOSE: normal turbinates; no sinus tenderness; OROPHARYNX: oral mucosa is normal; posterior pharynx shows no exudate;     NECK: range of motion is normal; trachea is midline;     RESPIRATORY: normal respiratory rate and pattern with no distress; normal breath sounds with no rales, rhonchi, wheezes or rubs;     CARDIOVASCULAR: normal rate; rhythm is regular;     GASTROINTESTINAL: nontender; normal bowel sounds; no organomegaly;     LYMPHATIC: no enlargement of cervical or facial nodes;     BREAST/INTEGUMENT: SKIN: no significant rashes or lesions; no suspicious moles;     MUSCULOSKELETAL: normal gait; normal range of motion of all  major muscle groups; no limb or joint pain with range of motion;     NEUROLOGIC: mental status: alert and oriented x 3; GROSSLY INTACT     PSYCHIATRIC: appropriate affect and demeanor; normal thought and perception;         Assessment:         Z00.00   Encounter for general adult medical examination without abnormal findings       E55.9   Vitamin D deficiency, unspecified       I10   Essential (primary) hypertension       E78.5   Hyperlipidemia, unspecified       F17.200   Nicotine dependence, unspecified, uncomplicated       Z13.31   Encounter for screening for depression           ORDERS:         Meds Prescribed:       [Refilled] cholecalciferol (vitamin D3) 4,000 unit oral capsule [Take 1 capsule(s) by mouth daily], #90 (ninety) capsules, Refills: 1 (one)       [Refilled] lisinopriL-hydrochlorothiazide 20-25 mg oral tablet [Take 1 tablet(s) by mouth daily], #90 (ninety) tablets, Refills: 1 (one)       [Refilled] atorvastatin 10 mg oral tablet [1 tab daily], #90 (ninety) tablets, Refills: 1 (one)         Lab Orders:       [id number]  PHYSF - H PHYSICAL: CMP, CBC, TSH, LIPID: [id number], [id number], [id number], [id number]  (Send-Out)            71295  VITD - [facility] Vitamin D, 25 Hydroxy  (Send-Out)            APPTO  Appointment need  (In-House)              Other Orders:         Screening mammogram results documented  (Send-Out)            4004F  Pt scrnd tobacco use rcvd tobacco cessation talk  (In-House)              Depression screen negative  (In-House)                      Plan:         Encounter for general adult medical examination without abnormal findingsUTD mammogram, pap smear. Discussed colonoscopy. UTD Tdap. Declines influenza vaccine.     LABORATORY:  Labs ordered to be performed today include PHYSICAL PANEL; CMP, CBC, TSH, LIPID.  MIPS Vaccines Flu and Pneumonia updated in Shot record Screening mammomgram done within last 2 years and results in are chart     FOLLOW-UP: Schedule a follow-up visit in 6 months.       COUNSELING was provided today regarding the following topics: healthy eating habits, regular exercise, breast self-exam, and Advised to see eye doctor and dentist regularly.            Orders:       [id number]  PHYSF - [facility] PHYSICAL: CMP, CBC, TSH, LIPID: [id number], [id number], [id number], [id number]  (Send-Out)              Screening mammogram results documented  (Send-Out)            APPTO  Appointment need  (In-House)                Patient Education Handouts:       Physical Exam 40-49 year, Female          Vitamin D deficiency, unspecified    LABORATORY:  Labs ordered to be performed today include Vitamin D.            Prescriptions:       [Refilled] cholecalciferol (vitamin D3) 4,000 unit oral capsule [Take 1 capsule(s) by mouth daily], #90 (ninety) capsules, Refills: 1 (one)           Orders:       [id number]  VITD - [facility] Vitamin D, 25 Hydroxy  (Send-Out)              Essential (primary) hypertensionStable.           Prescriptions:       [Refilled] lisinopriL-hydrochlorothiazide 20-25 mg oral tablet [Take 1 tablet(s) by mouth daily], #90 (ninety) tablets, Refills: 1 (one)         Hyperlipidemia, unspecifiedChecking labs          Prescriptions:       [Refilled] atorvastatin 10 mg oral tablet [1 tab daily], #90 (ninety) tablets, Refills: 1 (one)         Nicotine dependence, unspecified, uncomplicated        RECOMMENDATIONS given include: Counseled on smoking cessation and advised of the benefits to patient's health if she were to stop smoking. Counseling for less than 3 minutes.            Orders:       4004F  Pt scrnd tobacco use rcvd tobacco cessation talk  (In-House)              Encounter for screening for depression    MIPS PHQ-9 Depression Screening: Completed form scanned and in chart; Total Score 4; Negative Depression Screen           Orders:         Depression screen negative  (In-House)                  Patient Recommendations:        For  Encounter for general adult medical examination without abnormal findings:         Limit dietary intake of fat (especially saturated fat) and cholesterol.  Eat a variety of foods, including plenty of fruits, vegetables, and grain containg fiber, limit fat intake to 30% of total calories. Balance caloric intake with energy expended.    Maintaining regular physical activity is advised to help prevent heart disease, hypertension, diabetes, and obesity.    You should regularly examine your breasts, easily done while in the shower or with lotion.  Feel and look for differences in consistency from month to month, especially noting knots or lumps, changes in skin appearance, nipple retraction or discharge.  Schedule a follow-up visit in 6 months.          For  Nicotine dependence, unspecified, uncomplicated:        Stop smoking.              Charge Capture:         Primary Diagnosis:     Z00.00  Encounter for general adult medical examination without abnormal findings           Orders:      00687  Preventive medicine, established patient, age 40-64 years  (In-House)            APPTO  Appointment need  (In-House)              E55.9  Vitamin D deficiency, unspecified           Orders:      35510-81  Office/outpatient visit; established patient, level 4  (In-House)              I10  Essential (primary) hypertension     E78.5  Hyperlipidemia, unspecified     F17.200  Nicotine dependence, unspecified, uncomplicated           Orders:      4004F  Pt scrnd tobacco use rcvd tobacco cessation talk  (In-House)              Z13.31  Encounter for screening for depression           Orders:        Depression screen negative  (In-House)

## 2021-05-18 NOTE — PROGRESS NOTES
Carlota Saez 1975     Office/Outpatient Visit    Visit Date: Wed, Oct 2, 2019 09:10 am    Provider: Opal Paredes N.P. (Assistant: Ravin Morrison)    Location: [facility]        Electronically signed by Opal Paredes N.P. on  10/02/2019 09:52:42 AM                             SUBJECTIVE:        CC:     Ms. Saez is a 44 year old White female.  This is a follow-up visit.          HPI:         Patient to be evaluated for hypertension.  Her current cardiac medication regimen includes Lisinopril/HCTZ.  Ms. Saez does not check her blood pressure other than at her clinic appointments.  Compliance with treatment has been good; she takes her medication as directed and follows up as directed.          Dx with hyperlipidemia; current treatment includes Atorvastatin.  Most recent lab tests include Total Cholesterol:  159 (mg/dL) (2019), HDL:  54 (mg/dL) (2019), Triglycerides:  77 (mg/dL) (2019), LDL:  90 (mg/dL) (2019).      Hx Vitamin D deficiency. Taking supplement daily. Has noticed improvement in fatigue level since started taking.     ROS:     CONSTITUTIONAL:  Negative for chills and fever.      EYES:  Negative for eye drainage and eye pain.      E/N/T:  Negative for ear pain and sore throat.      CARDIOVASCULAR:  Negative for chest pain and palpitations.      RESPIRATORY:  Negative for dyspnea and frequent wheezing.      NEUROLOGICAL:  Negative for dizziness and headaches.      PSYCHIATRIC:  Negative for depression and suicidal thoughts.          PMH/FMH/SH:     Last Reviewed on 10/02/2019 09:25 AM by Opal Paredes    Past Medical History:                 PAST MEDICAL HISTORY         Hypertension     titanium clip in left breast, biopsy normal in the past         GYNECOLOGICAL HISTORY:        Problems with pregnancy have included dehydration.          CURRENT MEDICAL PROVIDERS:    Obstetrician/Gynecologist: Dr. Finnegan         [facility]              BONE DENSITY: was last done 14 with normal results     MAMMOGRAM: Done within last 2 years and results in are chart was last done 19 with normal results     PAP SMEAR: was last done 5/15/19 with normal results HPV normal         Surgical History:         oral surgeries;       section: X 1;         Family History:     Father: Medical history unknown     Mother: Cause of death was lung CA         Social History:     Occupation: [employer] in [employer]     Marital Status:      Children: 1 child         Tobacco/Alcohol/Supplements:     Last Reviewed on 10/02/2019 09:13 AM by Ravin Morrison    Tobacco: Current Smoker: She currently smokes every day, E-Cigarette.          Substance Abuse History:     Last Reviewed on 2018 12:27 PM by Opal Paredes    None         Mental Health History:     Last Reviewed on 2018 12:27 PM by Opal Paredes    NEGATIVE         Communicable Diseases (eg STDs):     Last Reviewed on 2018 12:27 PM by Opal Paredes    Reportable health conditions; NEGATIVE             Current Problems:     Last Reviewed on 2018 12:27 PM by Opal Paredes    Obesity, unspecified     Hyperlipidemia     Vitamin D deficiency, unspecified     Hypertension     Tobacco abuse         Immunizations:     Tdap (Tetanus, reduced diph, acellular pertussis) 2018         Allergies:     Last Reviewed on 10/02/2019 09:13 AM by Ravin Morrison    Penicillins: Unknown Reactions        Current Medications:     Last Reviewed on 10/02/2019 09:13 AM by Ravin Morrison    Vitamin D3 4,000IU Capsules Take 1 capsule(s) by mouth daily     Atorvastatin Calcium 10mg Tablet 1 tab daily     Lisinopril/Hydrochlorothiazide 20mg/25mg Tablet Take 1 tablet(s) by mouth daily     OTC Calcium 600mg w/ Vitamin D3 800IU Take one tablet bid         OBJECTIVE:        Vitals:         Historical:     05/15/2019  BP:   124/62 mm Hg ( (left arm, , sitting, );)     05/15/2019  Wt:   287.2lbs         Current: 10/2/2019 9:15:24 AM    Ht:  5 ft, 4 in;  Wt: 290.4 lbs;  BMI: 49.8    T: 97.8 F (oral);  BP: 137/71 mm Hg (right arm, sitting);  P: 70 bpm (right arm (BP Cuff), sitting);  sCr: 0.82 mg/dL;  GFR: 113.42        Exams:     PHYSICAL EXAM:     GENERAL: well developed, well nourished;  no apparent distress;     RESPIRATORY: normal respiratory rate and pattern with no distress; normal breath sounds with no rales, rhonchi, wheezes or rubs;     CARDIOVASCULAR: normal rate; rhythm is regular;     MUSCULOSKELETAL: normal gait;     NEUROLOGIC: mental status: alert and oriented x 3; GROSSLY INTACT     PSYCHIATRIC: appropriate affect and demeanor; normal speech pattern;         ASSESSMENT           401.1   I10  Hypertension              DDx:     272.4   E78.5  Hyperlipidemia              DDx:     268.9   E55.9  Vitamin D deficiency, unspecified              DDx:     305.1   F17.210  Tobacco abuse              DDx:         ORDERS:         Meds Prescribed:       Refill of: Lisinopril/Hydrochlorothiazide 20mg/25mg Tablet Take 1 tablet(s) by mouth daily  #90 (Ninety) tablet(s) Refills: 1       Refill of: Atorvastatin Calcium 10mg Tablet 1 tab daily  #90 (Ninety) tablet(s) Refills: 1       Refill of: Vitamin D3 4,000IU Capsules Take 1 capsule(s) by mouth daily  #90 (Ninety) capsule(s) Refills: 1         Radiology/Test Orders:       3014F  Screening mammography results documented and reviewed (PV)1  (In-House)           Lab Orders:       76523  [facility] Comp. Metabolic Panel  (Send-Out)         03690  [facility] Lipid Panel  (Send-Out)         APPTO  Appointment need  (In-House)           Other Orders:       4004F  Pt scrnd tobacco use rcvd tobacco cessation talk  (In-House)                   PLAN:          Hypertension     LABORATORY:  Labs ordered to be performed today include Comprehensive metabolic panel and lipid panel.      RECOMMENDATIONS given include: keep blood pressure log as directed and eat a low salt diet.   MIPS Vaccines Flu and Pneumonia updated in Shot record Screening mammomgram done within last 2 years and results in are chart     FOLLOW-UP: Schedule a follow-up visit in 6 months.            Prescriptions:       Refill of: Lisinopril/Hydrochlorothiazide 20mg/25mg Tablet Take 1 tablet(s) by mouth daily  #90 (Ninety) tablet(s) Refills: 1           Orders:       20040  COMP - [facility] Comp. Metabolic Panel  (Send-Out)         27171  LPDP - [facility] Lipid Panel  (Send-Out)         3014F  Screening mammography results documented and reviewed (PV)1  (In-House)         APPTO  Appointment need  (In-House)            Hyperlipidemia Stable. Will return for fasting labs.           Prescriptions:       Refill of: Atorvastatin Calcium 10mg Tablet 1 tab daily  #90 (Ninety) tablet(s) Refills: 1          Vitamin D deficiency, unspecified Stable.           Prescriptions:       Refill of: Vitamin D3 4,000IU Capsules Take 1 capsule(s) by mouth daily  #90 (Ninety) capsule(s) Refills: 1          Tobacco abuse Using E-cigarette.         RECOMMENDATIONS given include: Counseled on smoking cessation and advised of the benefits to patient's health if she were to stop smoking. Counseling for less than 3 minutes.            Orders:       4004F  Pt scrnd tobacco use rcvd tobacco cessation talk  (In-House)               Patient Recommendations:        For  Hypertension:     Keep a daily blood pressure log and report elevated blood pressure to provider as directed.  Schedule a follow-up visit in 6 months.          For  Tobacco abuse:         Stop smoking.              CHARGE CAPTURE           **Please note: ICD descriptions below are intended for billing purposes only and may not represent clinical diagnoses**        Primary Diagnosis:         401.1 Hypertension            I10    Essential (primary) hypertension              Orders:          55839   Office/outpatient visit; established patient, level 4  (In-House)             3014F   Screening  mammography results documented and reviewed (PV)1  (In-House)             APPTO   Appointment need  (In-House)           272.4 Hyperlipidemia            E78.5    Hyperlipidemia, unspecified    268.9 Vitamin D deficiency, unspecified            E55.9    Vitamin D deficiency, unspecified    305.1 Tobacco abuse            F17.210    Nicotine dependence, cigarettes, uncomplicated              Orders:          4004F   Pt scrnd tobacco use rcvd tobacco cessation talk  (In-House)

## 2021-05-18 NOTE — PROGRESS NOTES
Carlota Saez  1975     Office/Outpatient Visit    Visit Date:  04:07 pm    Provider: Nydia Rodriguez N.P. (Assistant: Elen Marsh LPN)    Location: [facility]        Electronically signed by Nydia Rodriguez N.P. on  2020 10:49:02 AM                             Subjective:        CC: Ms. Saez is a 45 year old White female.  can try [employer]ITY [phone], has spots on finger and hand, worried could be MRSA.;         HPI: televideo visit          Rash and other nonspecific skin eruption noted.  This has been a problem for the past week.  The rash has not occurred previously.  It is located primarily upper extremities.  She describes the rash as red and papular.  The rash has been essentially asymptomatic.  She denies any associated symptoms.  No contributing factors are identified.  hx mrsa several yrs ago.  dr ovalles tx her with 2 antibiotics x 1 month at that time.      ROS:     CONSTITUTIONAL:  Negative for chills, fatigue, fever, and weight change.      CARDIOVASCULAR:  Negative for chest pain, palpitations, tachycardia, orthopnea, and edema.      RESPIRATORY:  Negative for cough, dyspnea, and hemoptysis.      MUSCULOSKELETAL:  Negative for arthralgias, back pain, and myalgias.      INTEGUMENTARY/BREAST:  Positive for rash bilateral arms.      NEUROLOGICAL:  Negative for dizziness, headaches, paresthesias, and weakness.          Past Medical History / Family History / Social History:         Last Reviewed on 3/11/2020 12:28 PM by Opal Paredes    Past Medical History:                 PAST MEDICAL HISTORY         Hypertension     titanium clip in left breast, biopsy normal in the past         GYNECOLOGICAL HISTORY:        Problems with pregnancy have included dehydration.          CURRENT MEDICAL PROVIDERS:    Obstetrician/Gynecologist: Dr. Finnegan         PREVENTIVE HEALTH MAINTENANCE             BONE DENSITY: was last done 14 with normal results      MAMMOGRAM: Done within last 2 years and results in are chart was last done 10/21/2020 with normal results     PAP SMEAR: was last done 5/15/19 with normal results HPV normal         Surgical History:         oral surgeries;      section: X 1;         Family History:     Father: Medical history unknown     Mother: Cause of death was lung CA     Maternal great grandmother - Diabetes         Social History:     Occupation: [employer] in [employer]     Marital Status:      Children: 1 child         Tobacco/Alcohol/Supplements:     Last Reviewed on 2020 04:08 PM by Elen Marsh    Tobacco: Current Smoker: She currently smokes every day, 1 pack per day.          Substance Abuse History:     Last Reviewed on 2018 12:27 PM by Opal Paredes    None         Mental Health History:     Last Reviewed on 2018 12:27 PM by Opal Paredes    NEGATIVE         Communicable Diseases (eg STDs):     Last Reviewed on 2018 12:27 PM by Opal Paredes    Reportable health conditions; NEGATIVE         Current Problems:     Last Reviewed on 2018 12:27 PM by Opal Paredes    Vitamin D deficiency, unspecified    Essential (primary) hypertension    Hyperlipidemia, unspecified    Obesity, unspecified    Nicotine dependence, unspecified, uncomplicated    Encounter for general adult medical examination without abnormal findings    Encounter for screening for depression    Encounter for screening mammogram for malignant neoplasm of breast        Immunizations:     Tdap (Tetanus, reduced diph, acellular pertussis) 2018        Allergies:     Last Reviewed on 2020 11:56 AM by Monik Garcia    Penicillins: Unknown Reactions         Current Medications:     Last Reviewed on 2020 11:56 AM by Monik Garcia    lisinopriL-hydrochlorothiazide 20-25 mg oral tablet [TAKE 1 TABLET(S) BY MOUTH DAILY]    cholecalciferol (vitamin D3) 100 mcg (4,000 unit) oral capsule [Take 1 capsule(s) by mouth  "daily]    atorvastatin 10 mg oral tablet [1 tab daily]    OTC Calcium 600mg w/ Vitamin D3 800IU Take one tablet bid          Objective:        Exams:     PHYSICAL EXAM:     GENERAL:  well developed and nourished; appropriately groomed; in no apparent distress;     BREAST/INTEGUMENT: 2 areas left lower arm-  0.5 cm , red.   1 area right hand and 1 area right lower arm each 0.5 cm and red     NEUROLOGIC: mental status: alert and oriented x 3; GROSSLY INTACT     PSYCHIATRIC:  appropriate affect and demeanor; normal speech pattern; grossly normal memory;         Assessment:         R21   Rash and other nonspecific skin eruption           ORDERS:         Meds Prescribed:       [New Rx] doxycycline monohydrate 100 mg oral tablet [take 1 tablet (100 mg) by oral route 2 times per day], #60 (sixty) tablets, Refills: 0 (zero)       [New Rx] mupirocin 2 % Topical Ointment [apply a small amount to the affected area by topical route 2 times per day], #15 (fifteen) grams, Refills: 0 (zero)                 Plan:         Rash and other nonspecific skin eruption        RECOMMENDATIONS given include: avoid irritants (such as wool clothing, synthetics, etc.) and follow up if not improving..  Telehealth: Verbal consent obtained for visit to occur via televideo conferencing           Prescriptions:       [New Rx] doxycycline monohydrate 100 mg oral tablet [take 1 tablet (100 mg) by oral route 2 times per day], #60 (sixty) tablets, Refills: 0 (zero)       [New Rx] mupirocin 2 % Topical Ointment [apply a small amount to the affected area by topical route 2 times per day], #15 (fifteen) grams, Refills: 0 (zero)             Patient Recommendations:        For  Rash and other nonspecific skin eruption:        Avoid irritants such as wool clothing or synthetics; some \"no iron\" garments contain formaldehyde residue which may aggravate skin irritation.              Charge Capture:         Primary Diagnosis:     R21  Rash and other nonspecific " skin eruption           Orders:      25290  Office/outpatient visit; established patient, level 3  (In-House)

## 2021-05-18 NOTE — PROGRESS NOTES
Carlota Saez 1975     Office/Outpatient Visit    Visit Date: Wed, May 15, 2019 10:33 am    Provider: Opal Paredes N.P. (Assistant: Rosita Moss RN)    Location: [facility]        Electronically signed by Opal Paredes N.P. on  05/16/2019 08:04:11 AM                             SUBJECTIVE:        CC:     Ms. Saez is a 44 year old White female.  Well Woman Exam;         HPI:         Patient to be evaluated for well Woman Exam.  She cannot recall when she last had a physical exam.  She cannot recall the date of her last menstrual period.  She is not currently using any form of contraception.  She performs breast self-exams occasionally.    Her last Pap smear was in 2017 and was normal.   Her last mammogram was in 4/29/19 and was normal.   Her last DEXA was in 11/24/14 and was normal.   Preventative Health updated today.  Ms. Saez denies any history of abnormal Pap smears.  Tobacco: Uses E-cigs             PHQ-9 Depression Screening: Completed form scanned and in chart; Total Score 0 Alcohol Consumption Screening: Completed form scanned and in chart; Total Score 2     ROS:     CONSTITUTIONAL:  Negative for chills, fatigue, fever, and weight change.      EYES:  Negative for blurred vision, eye pain, and photophobia.      E/N/T:  Negative for hearing problems, E/N/T pain, congestion, rhinorrhea, epistaxis, hoarseness, and dental problems.      CARDIOVASCULAR:  Negative for chest pain, palpitations, tachycardia, orthopnea, and edema.      RESPIRATORY:  Negative for cough, dyspnea, and hemoptysis.      GASTROINTESTINAL:  Negative for abdominal pain, heartburn, constipation, diarrhea, and stool changes.      GENITOURINARY:  Negative for genital lesions, hematuria, menstrual problems, polyuria, abnormal vaginal bleeding, and vaginal discharge.      MUSCULOSKELETAL:  Negative for arthralgias, back pain, and myalgias.      NEUROLOGICAL:  Negative for dizziness, headaches, paresthesias, and weakness.       PSYCHIATRIC:  Negative for anxiety, depression, and sleep disturbances.          PMH/FMH/SH:     Last Reviewed on 3/13/2019 12:38 PM by Opal Paredes    Past Medical History:                 PAST MEDICAL HISTORY         Hypertension     titanium clip in left breast, biopsy normal in the past         GYNECOLOGICAL HISTORY:        Problems with pregnancy have included dehydration.          CURRENT MEDICAL PROVIDERS:    Obstetrician/Gynecologist: Dr. Finnegan         PREVENTIVE HEALTH MAINTENANCE             BONE DENSITY: was last done 14 with normal results     MAMMOGRAM: Done within last 2 years and results in are chart was last done 19 with normal results     PAP SMEAR: was last done 2017 with normal results         Surgical History:         oral surgeries;       section: X 1;         Family History:     Father: Medical history unknown     Mother: Cause of death was lung CA         Social History:     Occupation: [employer] in [employer]     Marital Status:      Children: 1 child         Tobacco/Alcohol/Supplements:     Last Reviewed on 5/15/2019 10:36 AM by Rosita Moss    Tobacco: Current Smoker: She currently smokes every day, E-Cigarette.          Substance Abuse History:     Last Reviewed on 2018 12:27 PM by Opal Paredes    None         Mental Health History:     Last Reviewed on 2018 12:27 PM by Opal Paredes    NEGATIVE         Communicable Diseases (eg STDs):     Last Reviewed on 2018 12:27 PM by Opal Paredes    Reportable health conditions; NEGATIVE             Current Problems:     Last Reviewed on 2018 12:27 PM by Opal Paredes    Obesity, unspecified     Hyperlipidemia     Vitamin D deficiency, unspecified     Hypertension     Screening for depression         Immunizations:     Tdap (Tetanus, reduced diph, acellular pertussis) 2018         Allergies:     Last Reviewed on 5/15/2019 10:35 AM by Rosita Moss    Penicillins:  Unknown Reactions        Current Medications:     Last Reviewed on 5/15/2019 10:36 AM by Rosita Moss    Vitamin D3 4,000IU Capsules Take 1 capsule(s) by mouth daily     Atorvastatin Calcium 10mg Tablet 1 tab daily     Lisinopril/Hydrochlorothiazide 20mg/25mg Tablet Take 1 tablet(s) by mouth daily     OTC Calcium 600mg w/ Vitamin D3 800IU Take one tablet bid         OBJECTIVE:        Vitals:         Current: 5/15/2019 10:41:17 AM    Ht:  5 ft, 4 in;  Wt: 287.2 lbs;  BMI: 49.3    T: 97.9 F (oral);  BP: 124/62 mm Hg (left arm, sitting);  P: 71 bpm (left arm (BP Cuff), sitting);  sCr: 0.82 mg/dL;  GFR: 112.89        Exams:     PHYSICAL EXAM:     GENERAL: vital signs recorded - well developed, well nourished;  no apparent distress;     RESPIRATORY: normal respiratory rate and pattern with no distress; normal breath sounds with no rales, rhonchi, wheezes or rubs;     CARDIOVASCULAR: normal rate; rhythm is regular;  no systolic murmur; no edema;     GASTROINTESTINAL: nontender; normal bowel sounds; no organomegaly;     GENITOURINARY:  normal appearance of external genitalia, urethra, and cervix; no cervical motion tenderness; no adnexal tenderness or masses on bimanual exam;     BREAST/INTEGUMENT: BREASTS: breast exam is normal without masses, skin changes, or nipple discharge; SKIN: no significant rashes or lesions; no suspicious moles;     MUSCULOSKELETAL: normal gait;     NEUROLOGICAL:  cranial nerves, motor and sensory function, reflexes, gait and coordination are all intact;     PSYCHIATRIC:  appropriate affect and demeanor; normal speech pattern; grossly normal memory;         ASSESSMENT:           V72.31     Well Woman Exam     V79.0   Z13.89  Screening for depression              DDx:         ORDERS:         Radiology/Test Orders:       3014F  Screening mammography results documented and reviewed (PV)1  (In-House)           Lab Orders:       06911  Cytopathology, slides, cervical or vaginal; manual screening  under MD supervision  (Send-Out)           Other Orders:         Depression screen negative  (In-House)           Negative EtOH screen  (In-House)         4004F  Pt scrnd tobacco use rcvd tobacco cessation talk  (In-House)                   PLAN:          Well Woman Exam         COUNSELING was provided today regarding the following topics: healthy eating habits, regular exercise, and breast self-exam.      RECOMMENDATIONS given include: Counseled on smoking cessation and advised of the benefits to patient's health if she were to stop smoking. Counseling for less than 3 minutes.  MIPS Vaccines Flu and Pneumonia updated in Shot record     MAMMOGRAM: Done within last 2 years and results in are chart     FOLLOW-UP: Patient already scheduled for follow-up appointment with PCP           Orders:      78139  Cytopathology, slides, cervical or vaginal; manual screening under MD supervision  (Send-Out)         3014F  Screening mammography results documented and reviewed (PV)1  (In-House)         4004F  Pt scrnd tobacco use rcvd tobacco cessation talk  (In-House)            Screening for depression     MIPS PHQ-9 Depression Screening Completed form scanned and in chart; Total Score 0 Negative alcohol screen           Orders:         Depression screen negative  (In-House)           Negative EtOH screen  (In-House)               Patient Recommendations:        For  Well Woman Exam:         Limit dietary intake of fat (especially saturated fat) and cholesterol.  Eat a variety of foods, including plenty of fruits, vegetables, and grain containg fiber, limit fat intake to 30% of total calories. Balance caloric intake with energy expended.    Maintaining regular physical activity is advised to help prevent heart disease, hypertension, diabetes, and obesity.    You should regularly examine your breasts, easily done while in the shower or with lotion.  Feel and look for differences in consistency from month to  month, especially noting knots or lumps, changes in skin appearance, nipple retraction or discharge.      Stop smoking.              CHARGE CAPTURE:           Primary Diagnosis:     V72.31    Well Woman Exam                Z01.419    Encounter for gynecological examination (general) (routine) without abnormal findings                       Orders:          01073   Preventive medicine, established patient, age 40-64 years  (In-House)             3014F   Screening mammography results documented and reviewed (PV)1  (In-House)             4004F   Pt scrnd tobacco use rcvd tobacco cessation talk  (In-House)           V79.0 Screening for depression            Z13.89    Encounter for screening for other disorder              Orders:             Depression screen negative  (In-House)                Negative EtOH screen  (In-House)

## 2021-05-18 NOTE — PROGRESS NOTES
Carlota Saez 1975     Office/Outpatient Visit    Visit Date: Fri, Jun 8, 2018 03:46 pm    Provider: Opal Paredes N.P. (Assistant: Fariba Sanchez MA)    Location: [facility]        Electronically signed by Opal Paredes N.P. on  06/09/2018 06:05:54 PM                             SUBJECTIVE:        CC: depression 7, alcohol 0     Ms. Saez is a 43 year old White female.  Here for immunizations.  establish care, sinus pressure, productive cough, eye drainage;         HPI: Thinks that her last tetanus shot was 10 years ago.  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 Previously seeing La Nena Barroso NP, in [place]. Reports that she previously had low Vitamin D levels and was treated with once weekly dosing. No longer taking Vitamin D supplement.         Ms. Saez presents with upper respiratory illness.  These have been present for the past one week.  The symptoms include cough, eye itching/watering, sinus pain/pressure and runny nose.  She denies fever.  She has already tried to relieve the symptoms with Katie Dodson cold medications, allergy medication.          In regard to the hypertension, this was first diagnosed more than 5 years ago.  Her current cardiac medication regimen includes Lisinopril/HCTZ.  She did not bring her blood pressure diary, but says that pressures have been well controlled.  Compliance with treatment has been good.      ROS:     CONSTITUTIONAL:  Negative for chills and fever.      E/N/T:  Positive for ear pain and sinus pressure.      CARDIOVASCULAR:  Negative for chest pain and palpitations.      RESPIRATORY:  Positive for recent cough.   Negative for dyspnea or frequent wheezing.      GASTROINTESTINAL:  Negative for abdominal pain and vomiting.      INTEGUMENTARY/BREAST:  Negative for rash.      NEUROLOGICAL:  Negative for dizziness and headaches.      ENDOCRINE:  Negative for polydipsia and polyphagia.       PSYCHIATRIC:  Negative for depression and suicidal thoughts.          PMH/FMH/SH:     Past Medical History:                 PAST MEDICAL HISTORY         Hypertension     titanium clip in left breast, biopsy normal in the past         GYNECOLOGICAL HISTORY:        Problems with pregnancy have included dehydration.          CURRENT MEDICAL PROVIDERS:    Obstetrician/Gynecologist: Dr. Finnegan         PREVENTIVE HEALTH MAINTENANCE             BONE DENSITY: was last done  with normal results     COLORECTAL CANCER SCREENING:     MAMMOGRAM: was last done 2017 [facility]     PAP SMEAR: was last done 2017 with normal results         Surgical History:         oral surgeries;       section: X 1;         Family History:     Father: Medical history unknown     Mother: Cause of death was lung CA         Social History:     Occupation: [email or web address]     Marital Status:      Children: 1 child         Tobacco/Alcohol/Supplements:     Tobacco: She has a past history of cigarette smoking; quit date:  3/18.   She currently uses smokeless tobacco, vapor cans/pouches daily..          Substance Abuse History:     None         Mental Health History:     NEGATIVE         Communicable Diseases (eg STDs):     Reportable health conditions; NEGATIVE             Current Problems:       None Recorded         Immunizations:     None        Allergies:     Last Reviewed on 2018 03:54 PM by Fariba Sanchez    Penicillins: Unknown Reactions        Current Medications:     Last Reviewed on 2018 03:55 PM by Fariba Sanchez    Lisinopril/Hydrochlorothiazide 20mg/25mg Tablet one a day         OBJECTIVE:        Vitals:         Current: 2018 3:53:45 PM    Wt: 278.8 lbs    T: 98.4 F;  BP: 117/78 mm Hg (left arm, sitting);  P: 69 bpm (finger clip, sitting)    O2 Sat: 98 % (room air)        Exams:     PHYSICAL EXAM:     GENERAL: well developed, well nourished;  no apparent distress;     EYES:  PERRL, EOMI     E/N/T: EARS: external auditory canal normal;  both TMs are dull;  NOSE: normal turbinates; bilateral maxillary sinus tenderness present; OROPHARYNX: oral mucosa is normal; posterior pharynx shows no exudate;     NECK: range of motion is normal; trachea is midline;     RESPIRATORY: normal respiratory rate and pattern with no distress; normal breath sounds with no rales, rhonchi, wheezes or rubs;     CARDIOVASCULAR: normal rate; rhythm is regular;     MUSCULOSKELETAL: normal gait;     NEUROLOGIC: mental status: alert and oriented x 3; GROSSLY INTACT     PSYCHIATRIC: appropriate affect and demeanor;         ASSESSMENT           461.8   J01.90  Acute sinusitis, other              DDx:     401.1   I10  Hypertension              DDx:     268.9   E55.9  Vitamin D deficiency, unspecified              DDx:         ORDERS:         Meds Prescribed:       Tessalon Perles (Benzonatate) 100mg Capsules One PO Q 8 hours PRN cough  #30 (Thirty) capsule(s) Refills: 0       Doxycycline Monohydrate 100mg Capsules Take 1 capsule(s) by mouth bid x10 days  #20 (Twenty) capsule(s) Refills: 0       Refill of: Lisinopril/Hydrochlorothiazide 20mg/25mg Tablet Take 1 tablet(s) by mouth daily  #30 (Thirty) tablet(s) Refills: 2         Lab Orders:       [id number]  HTNLP - [facility] CMP AND LIPID: [id number], [id number]  (Send-Out)         APPTO  Appointment need  (In-House)         [id number]  VITD - [facility] Vitamin D, 25 Hydroxy  (Send-Out)                   PLAN:          Acute sinusitis, other         RECOMMENDATIONS given include: Push Fluids, Rest, Follow up if no improvement or worsening symptoms like high fevers, vomiting, weakness, or increasing shortness of air.     and Coricidin HBP.      FOLLOW-UP: Schedule follow-up appointments on a p.r.n. basis.            Prescriptions: Advised that cough medication may cause drowsiness.       Tessalon Perles (Benzonatate) 100mg Capsules One PO Q 8 hours PRN cough  #30 (Thirty) capsule(s) Refills: 0        Doxycycline Monohydrate 100mg Capsules Take 1 capsule(s) by mouth bid x10 days  #20 (Twenty) capsule(s) Refills: 0          Hypertension Will check labs at follow up visit.     LABORATORY:  Labs ordered to be performed today include HTN/Lipid Panel: CMP, Lipid.      RECOMMENDATIONS given include: keep blood pressure log as directed and eat a low salt diet.      FOLLOW-UP: Schedule a follow-up visit in 3 months.            Prescriptions:       Refill of: Lisinopril/Hydrochlorothiazide 20mg/25mg Tablet Take 1 tablet(s) by mouth daily  #30 (Thirty) tablet(s) Refills: 2           Orders:       [id number]  HTNLP - [facility] CMP AND LIPID: [id number], [id number]  (Send-Out)         APPTO  Appointment need  (In-House)            Vitamin D deficiency, unspecified     LABORATORY:  Labs ordered to be performed today include Vitamin D.      RECOMMENDATIONS given include: Further recommendation to be given after test results are complete.            Orders:       [id number]  VITD - [facility] Vitamin D, 25 Hydroxy  (Send-Out)               Patient Recommendations:        For  Acute sinusitis, other:     Schedule follow-up appointments as needed.          For  Hypertension:     Keep a daily blood pressure log and report elevated blood pressure to provider as directed.  Schedule a follow-up visit in 3 months.              CHARGE CAPTURE           **Please note: ICD descriptions below are intended for billing purposes only and may not represent clinical diagnoses**        Primary Diagnosis:         461.8 Acute sinusitis, other            J01.90    Acute sinusitis, unspecified              Orders:          [id number]   Office visit - new pt, level 3  (In-House)           401.1 Hypertension            I10    Essential (primary) hypertension              Orders:          APPTO   Appointment need  (In-House)           268.9 Vitamin D deficiency, unspecified            E55.9    Vitamin D deficiency, unspecified

## 2021-07-01 VITALS
HEIGHT: 64 IN | DIASTOLIC BLOOD PRESSURE: 72 MMHG | SYSTOLIC BLOOD PRESSURE: 136 MMHG | HEART RATE: 68 BPM | BODY MASS INDEX: 49.82 KG/M2 | WEIGHT: 291.8 LBS | TEMPERATURE: 97.6 F

## 2021-07-01 VITALS
HEART RATE: 70 BPM | HEIGHT: 64 IN | SYSTOLIC BLOOD PRESSURE: 137 MMHG | DIASTOLIC BLOOD PRESSURE: 71 MMHG | BODY MASS INDEX: 49.58 KG/M2 | TEMPERATURE: 97.8 F | WEIGHT: 290.4 LBS

## 2021-07-01 VITALS
BODY MASS INDEX: 48.42 KG/M2 | DIASTOLIC BLOOD PRESSURE: 66 MMHG | WEIGHT: 283.6 LBS | HEART RATE: 65 BPM | TEMPERATURE: 98.3 F | HEIGHT: 64 IN | SYSTOLIC BLOOD PRESSURE: 128 MMHG

## 2021-07-01 VITALS
HEIGHT: 64 IN | BODY MASS INDEX: 49.03 KG/M2 | TEMPERATURE: 97.9 F | DIASTOLIC BLOOD PRESSURE: 62 MMHG | WEIGHT: 287.2 LBS | SYSTOLIC BLOOD PRESSURE: 124 MMHG | HEART RATE: 71 BPM

## 2021-07-01 VITALS
DIASTOLIC BLOOD PRESSURE: 78 MMHG | SYSTOLIC BLOOD PRESSURE: 117 MMHG | WEIGHT: 278.8 LBS | OXYGEN SATURATION: 98 % | TEMPERATURE: 98.4 F | HEART RATE: 69 BPM

## 2021-07-02 VITALS
HEART RATE: 73 BPM | HEIGHT: 64 IN | WEIGHT: 272.4 LBS | SYSTOLIC BLOOD PRESSURE: 130 MMHG | DIASTOLIC BLOOD PRESSURE: 74 MMHG | TEMPERATURE: 97.1 F | BODY MASS INDEX: 46.51 KG/M2

## 2021-07-02 VITALS
DIASTOLIC BLOOD PRESSURE: 73 MMHG | HEART RATE: 68 BPM | BODY MASS INDEX: 48.38 KG/M2 | WEIGHT: 283.4 LBS | SYSTOLIC BLOOD PRESSURE: 137 MMHG | TEMPERATURE: 98.7 F | HEIGHT: 64 IN

## 2021-07-02 VITALS
HEIGHT: 64 IN | TEMPERATURE: 97 F | HEART RATE: 68 BPM | BODY MASS INDEX: 45.93 KG/M2 | SYSTOLIC BLOOD PRESSURE: 144 MMHG | DIASTOLIC BLOOD PRESSURE: 81 MMHG | WEIGHT: 269 LBS

## 2021-10-21 DIAGNOSIS — Z12.31 ENCOUNTER FOR SCREENING MAMMOGRAM FOR MALIGNANT NEOPLASM OF BREAST: Primary | ICD-10-CM

## 2021-10-21 RX ORDER — ATORVASTATIN CALCIUM 10 MG/1
10 TABLET, FILM COATED ORAL DAILY
Qty: 90 TABLET | Refills: 0 | Status: SHIPPED | OUTPATIENT
Start: 2021-10-21 | End: 2021-11-24 | Stop reason: SDUPTHER

## 2021-10-21 RX ORDER — LISINOPRIL AND HYDROCHLOROTHIAZIDE 25; 20 MG/1; MG/1
1 TABLET ORAL DAILY
COMMUNITY
End: 2021-10-21 | Stop reason: SDUPTHER

## 2021-10-21 RX ORDER — ATORVASTATIN CALCIUM 10 MG/1
10 TABLET, FILM COATED ORAL DAILY
COMMUNITY
End: 2021-10-21 | Stop reason: SDUPTHER

## 2021-10-21 RX ORDER — LISINOPRIL AND HYDROCHLOROTHIAZIDE 25; 20 MG/1; MG/1
1 TABLET ORAL DAILY
Qty: 90 TABLET | Refills: 0 | Status: SHIPPED | OUTPATIENT
Start: 2021-10-21 | End: 2021-11-24 | Stop reason: SDUPTHER

## 2021-11-24 ENCOUNTER — OFFICE VISIT (OUTPATIENT)
Dept: FAMILY MEDICINE CLINIC | Age: 46
End: 2021-11-24

## 2021-11-24 VITALS
HEIGHT: 64 IN | DIASTOLIC BLOOD PRESSURE: 74 MMHG | BODY MASS INDEX: 46.71 KG/M2 | OXYGEN SATURATION: 100 % | SYSTOLIC BLOOD PRESSURE: 144 MMHG | WEIGHT: 273.6 LBS | HEART RATE: 61 BPM | TEMPERATURE: 97.3 F

## 2021-11-24 DIAGNOSIS — Z00.00 ANNUAL PHYSICAL EXAM: Primary | ICD-10-CM

## 2021-11-24 DIAGNOSIS — Z12.31 ENCOUNTER FOR SCREENING MAMMOGRAM FOR MALIGNANT NEOPLASM OF BREAST: ICD-10-CM

## 2021-11-24 DIAGNOSIS — E55.9 VITAMIN D DEFICIENCY: ICD-10-CM

## 2021-11-24 DIAGNOSIS — I10 ESSENTIAL (PRIMARY) HYPERTENSION: ICD-10-CM

## 2021-11-24 DIAGNOSIS — E78.2 MIXED HYPERLIPIDEMIA: ICD-10-CM

## 2021-11-24 DIAGNOSIS — F43.0 STRESS REACTION: ICD-10-CM

## 2021-11-24 DIAGNOSIS — F17.210 CIGARETTE NICOTINE DEPENDENCE WITHOUT COMPLICATION: ICD-10-CM

## 2021-11-24 DIAGNOSIS — Z11.59 SCREENING FOR VIRAL DISEASE: ICD-10-CM

## 2021-11-24 PROBLEM — E78.5 HYPERLIPIDEMIA, UNSPECIFIED: Status: ACTIVE | Noted: 2021-11-24

## 2021-11-24 PROBLEM — F17.200 NICOTINE DEPENDENCE, UNSPECIFIED, UNCOMPLICATED: Status: ACTIVE | Noted: 2021-11-24

## 2021-11-24 PROBLEM — E66.9 OBESITY, UNSPECIFIED: Status: ACTIVE | Noted: 2021-11-24

## 2021-11-24 PROCEDURE — 99396 PREV VISIT EST AGE 40-64: CPT | Performed by: NURSE PRACTITIONER

## 2021-11-24 RX ORDER — ATORVASTATIN CALCIUM 10 MG/1
10 TABLET, FILM COATED ORAL DAILY
Qty: 90 TABLET | Refills: 1 | Status: SHIPPED | OUTPATIENT
Start: 2021-11-24 | End: 2022-05-25 | Stop reason: SDUPTHER

## 2021-11-24 RX ORDER — CETIRIZINE HYDROCHLORIDE 10 MG/1
10 TABLET ORAL DAILY
COMMUNITY

## 2021-11-24 RX ORDER — LISINOPRIL AND HYDROCHLOROTHIAZIDE 25; 20 MG/1; MG/1
1 TABLET ORAL DAILY
Qty: 90 TABLET | Refills: 1 | Status: SHIPPED | OUTPATIENT
Start: 2021-11-24 | End: 2022-05-25 | Stop reason: SDUPTHER

## 2021-11-24 NOTE — PROGRESS NOTES
Chief Complaint  Carlota Saez presents to [facility] for Annual Exam and Mental Health Problem    Subjective          History of Present Illness  Carlota is here today for annual preventive exam.   UTD pap smear. 5/15/2019 normal. HPV normal.   Last mammogram 4//24/2019 normal.   Has not had screening colonoscopy. Declines to schedule at this time.   UTD covid vaccine.   Declines influenza vaccine.   Additionally, following up on hypertension. Her current cardiac medication regimen includes Lisinopril/HCTZ 20/25 daily. She does not check her blood pressure other than her clinic appointments. Reports that her BP was 130/80 at her optometry appointment last week.   Dx with hyperlipidemia; current treatment includes Atorvastatin 10 mg daily. Tolerating well.   She continues on Vitamin D supplement for Vitamin D deficiency.   Reports that she has been feeling down and had some stressors. Has been on medication in the past. Both Lexapro and Celexa and did not tolerate. Declines to restart medication at this time.     Review of Systems   Constitutional: Negative for chills and fever.   HENT: Negative for ear pain and sore throat.    Eyes: Negative for blurred vision and redness.   Respiratory: Negative for cough, shortness of breath and wheezing.    Cardiovascular: Negative for chest pain and palpitations.   Gastrointestinal: Negative for abdominal pain, constipation, diarrhea, nausea and vomiting.   Genitourinary: Negative for dysuria, frequency and urgency.   Musculoskeletal: Negative for back pain and joint swelling.   Skin: Negative for rash.   Neurological: Negative for dizziness and headache.   Psychiatric/Behavioral: Positive for depressed mood. Negative for suicidal ideas.         Allergies   Allergen Reactions   • Penicillins Unknown - Low Severity     Childhood       History reviewed. No pertinent past medical history.  Current Outpatient Medications   Medication Sig Dispense  "Refill   • atorvastatin (LIPITOR) 10 MG tablet Take 1 tablet by mouth Daily. 90 tablet 1   • cetirizine (zyrTEC) 10 MG tablet Take 10 mg by mouth Daily.     • Cholecalciferol 50 MCG (2000 UT) tablet Take 2 tablets by mouth Daily. 90 tablet 0   • lisinopril-hydrochlorothiazide (PRINZIDE,ZESTORETIC) 20-25 MG per tablet Take 1 tablet by mouth Daily. 90 tablet 1     No current facility-administered medications for this visit.     Past Surgical History:   Procedure Laterality Date   •  SECTION        Social History     Tobacco Use   • Smoking status: Current Every Day Smoker     Packs/day: 1.00     Types: Cigarettes   • Smokeless tobacco: Never Used   Vaping Use   • Vaping Use: Never used   Substance Use Topics   • Alcohol use: Not Currently   • Drug use: Never     Family History   Problem Relation Age of Onset   • Lung cancer Mother      Health Maintenance Due   Topic Date Due   • COLORECTAL CANCER SCREENING  Never done   • ANNUAL PHYSICAL  Never done   • HEPATITIS C SCREENING  Never done   • LIPID PANEL  2021      Immunization History   Administered Date(s) Administered   • COVID-19 (MODERNA) 1st, 2nd, 3rd Dose Only 2021, 10/28/2021   • Tdap 2018        Objective     Vitals:    21 1134   BP: 144/74   Pulse: 61   Temp: 97.3 °F (36.3 °C)   SpO2: 100%   Weight: 124 kg (273 lb 9.6 oz)   Height: 162.6 cm (64\")     Body mass index is 46.96 kg/m².     Physical Exam  Vitals reviewed.   Constitutional:       General: She is not in acute distress.     Appearance: Normal appearance. She is well-developed.   HENT:      Head: Normocephalic and atraumatic.      Right Ear: Hearing, tympanic membrane and ear canal normal.      Left Ear: Hearing, tympanic membrane and ear canal normal.      Mouth/Throat:      Mouth: Mucous membranes are moist.   Eyes:      Extraocular Movements: Extraocular movements intact.      Pupils: Pupils are equal, round, and reactive to light.   Cardiovascular:      Rate and " Rhythm: Normal rate and regular rhythm.      Pulses: Normal pulses.      Heart sounds: Normal heart sounds.   Pulmonary:      Effort: Pulmonary effort is normal.      Breath sounds: Normal breath sounds.   Abdominal:      General: Bowel sounds are normal.      Palpations: Abdomen is soft.      Tenderness: There is no abdominal tenderness.   Musculoskeletal:         General: Normal range of motion.      Cervical back: Normal range of motion and neck supple.   Skin:     General: Skin is warm and dry.   Neurological:      Mental Status: She is alert and oriented to person, place, and time.   Psychiatric:         Mood and Affect: Mood normal.           Result Review :     The following data was reviewed by: CARI Alvarez on 11/24/2021:          Vitamin D 25 Hydroxy (09/09/2020 12:41)  Physical [facility] Panel (09/09/2020 12:41)                  Assessment and Plan      Diagnoses and all orders for this visit:    1. Annual physical exam (Primary)  Comments:  Counseled health maintenance recommendations.   Orders:  -     Comprehensive Metabolic Panel; Future  -     Lipid Panel; Future  -     CBC w AUTO Differential; Future  -     TSH; Future    2. Encounter for screening mammogram for malignant neoplasm of breast  -     Cancel: Mammo Screening Digital Tomosynthesis Bilateral With CAD; Future  -     Mammo Screening Digital Tomosynthesis Bilateral With CAD; Future    3. Essential (primary) hypertension  Comments:  Stable. Continue current medication.  Orders:  -     lisinopril-hydrochlorothiazide (PRINZIDE,ZESTORETIC) 20-25 MG per tablet; Take 1 tablet by mouth Daily.  Dispense: 90 tablet; Refill: 1    4. Mixed hyperlipidemia  Comments:  Rechecking lab.  Orders:  -     atorvastatin (LIPITOR) 10 MG tablet; Take 1 tablet by mouth Daily.  Dispense: 90 tablet; Refill: 1    5. Screening for viral disease  -     Hepatitis C antibody; Future    6. Stress reaction  Comments:  Declines need for medication at this  time. Discussed counseling, exercise. F/U persistent or worsening symptoms.     7. Vitamin D deficiency  Comments:  Continue daily supplement    8. Cigarette nicotine dependence without complication    Patient has been educated on the risk of diseases from using tobacco products such as cancer, COPD, and heart disease and has been advised to quit. I spent less than 3 minutes counseling the patient.              Follow Up     Return in about 6 months (around 5/24/2022) for Recheck.

## 2021-11-30 ENCOUNTER — LAB (OUTPATIENT)
Dept: LAB | Facility: HOSPITAL | Age: 46
End: 2021-11-30

## 2021-11-30 DIAGNOSIS — Z11.59 SCREENING FOR VIRAL DISEASE: ICD-10-CM

## 2021-11-30 DIAGNOSIS — Z00.00 ANNUAL PHYSICAL EXAM: ICD-10-CM

## 2021-11-30 LAB
BASOPHILS # BLD AUTO: 0.04 10*3/MM3 (ref 0–0.2)
BASOPHILS NFR BLD AUTO: 0.7 % (ref 0–1.5)
DEPRECATED RDW RBC AUTO: 44 FL (ref 37–54)
EOSINOPHIL # BLD AUTO: 0.06 10*3/MM3 (ref 0–0.4)
EOSINOPHIL NFR BLD AUTO: 1 % (ref 0.3–6.2)
ERYTHROCYTE [DISTWIDTH] IN BLOOD BY AUTOMATED COUNT: 12.7 % (ref 12.3–15.4)
HCT VFR BLD AUTO: 43.3 % (ref 34–46.6)
HGB BLD-MCNC: 14.8 G/DL (ref 12–15.9)
IMM GRANULOCYTES # BLD AUTO: 0.02 10*3/MM3 (ref 0–0.05)
IMM GRANULOCYTES NFR BLD AUTO: 0.3 % (ref 0–0.5)
LYMPHOCYTES # BLD AUTO: 1.71 10*3/MM3 (ref 0.7–3.1)
LYMPHOCYTES NFR BLD AUTO: 28.5 % (ref 19.6–45.3)
MCH RBC QN AUTO: 31.5 PG (ref 26.6–33)
MCHC RBC AUTO-ENTMCNC: 34.2 G/DL (ref 31.5–35.7)
MCV RBC AUTO: 92.1 FL (ref 79–97)
MONOCYTES # BLD AUTO: 0.27 10*3/MM3 (ref 0.1–0.9)
MONOCYTES NFR BLD AUTO: 4.5 % (ref 5–12)
NEUTROPHILS NFR BLD AUTO: 3.91 10*3/MM3 (ref 1.7–7)
NEUTROPHILS NFR BLD AUTO: 65 % (ref 42.7–76)
PLATELET # BLD AUTO: 235 10*3/MM3 (ref 140–450)
PMV BLD AUTO: 10 FL (ref 6–12)
RBC # BLD AUTO: 4.7 10*6/MM3 (ref 3.77–5.28)
WBC NRBC COR # BLD: 6.01 10*3/MM3 (ref 3.4–10.8)

## 2021-11-30 PROCEDURE — 84443 ASSAY THYROID STIM HORMONE: CPT

## 2021-11-30 PROCEDURE — 36415 COLL VENOUS BLD VENIPUNCTURE: CPT

## 2021-11-30 PROCEDURE — 80053 COMPREHEN METABOLIC PANEL: CPT

## 2021-11-30 PROCEDURE — 85025 COMPLETE CBC W/AUTO DIFF WBC: CPT

## 2021-11-30 PROCEDURE — 80061 LIPID PANEL: CPT

## 2021-11-30 PROCEDURE — 86803 HEPATITIS C AB TEST: CPT

## 2021-12-01 LAB
ALBUMIN SERPL-MCNC: 4.9 G/DL (ref 3.5–5.2)
ALBUMIN/GLOB SERPL: 1.6 G/DL
ALP SERPL-CCNC: 64 U/L (ref 39–117)
ALT SERPL W P-5'-P-CCNC: 30 U/L (ref 1–33)
ANION GAP SERPL CALCULATED.3IONS-SCNC: 7.5 MMOL/L (ref 5–15)
AST SERPL-CCNC: 29 U/L (ref 1–32)
BILIRUB SERPL-MCNC: 0.4 MG/DL (ref 0–1.2)
BUN SERPL-MCNC: 16 MG/DL (ref 6–20)
BUN/CREAT SERPL: 20 (ref 7–25)
CALCIUM SPEC-SCNC: 9.6 MG/DL (ref 8.6–10.5)
CHLORIDE SERPL-SCNC: 99 MMOL/L (ref 98–107)
CHOLEST SERPL-MCNC: 194 MG/DL (ref 0–200)
CO2 SERPL-SCNC: 29.5 MMOL/L (ref 22–29)
CREAT SERPL-MCNC: 0.8 MG/DL (ref 0.57–1)
GFR SERPL CREATININE-BSD FRML MDRD: 77 ML/MIN/1.73
GLOBULIN UR ELPH-MCNC: 3.1 GM/DL
GLUCOSE SERPL-MCNC: 98 MG/DL (ref 65–99)
HCV AB SER DONR QL: NORMAL
HDLC SERPL-MCNC: 59 MG/DL (ref 40–60)
LDLC SERPL CALC-MCNC: 116 MG/DL (ref 0–100)
LDLC/HDLC SERPL: 1.93 {RATIO}
POTASSIUM SERPL-SCNC: 4 MMOL/L (ref 3.5–5.2)
PROT SERPL-MCNC: 8 G/DL (ref 6–8.5)
SODIUM SERPL-SCNC: 136 MMOL/L (ref 136–145)
TRIGL SERPL-MCNC: 105 MG/DL (ref 0–150)
TSH SERPL DL<=0.05 MIU/L-ACNC: 2.59 UIU/ML (ref 0.27–4.2)
VLDLC SERPL-MCNC: 19 MG/DL (ref 5–40)

## 2022-05-25 ENCOUNTER — OFFICE VISIT (OUTPATIENT)
Dept: FAMILY MEDICINE CLINIC | Age: 47
End: 2022-05-25

## 2022-05-25 ENCOUNTER — LAB (OUTPATIENT)
Dept: LAB | Facility: HOSPITAL | Age: 47
End: 2022-05-25

## 2022-05-25 VITALS
TEMPERATURE: 98.1 F | WEIGHT: 280 LBS | SYSTOLIC BLOOD PRESSURE: 122 MMHG | HEIGHT: 64 IN | HEART RATE: 85 BPM | DIASTOLIC BLOOD PRESSURE: 87 MMHG | BODY MASS INDEX: 47.8 KG/M2

## 2022-05-25 DIAGNOSIS — E78.2 MIXED HYPERLIPIDEMIA: ICD-10-CM

## 2022-05-25 DIAGNOSIS — F17.210 CIGARETTE NICOTINE DEPENDENCE WITHOUT COMPLICATION: ICD-10-CM

## 2022-05-25 DIAGNOSIS — E55.9 VITAMIN D DEFICIENCY: ICD-10-CM

## 2022-05-25 DIAGNOSIS — I10 ESSENTIAL (PRIMARY) HYPERTENSION: Primary | ICD-10-CM

## 2022-05-25 DIAGNOSIS — Z12.31 ENCOUNTER FOR SCREENING MAMMOGRAM FOR MALIGNANT NEOPLASM OF BREAST: ICD-10-CM

## 2022-05-25 LAB — 25(OH)D3 SERPL-MCNC: 51.3 NG/ML (ref 30–100)

## 2022-05-25 PROCEDURE — 36415 COLL VENOUS BLD VENIPUNCTURE: CPT

## 2022-05-25 PROCEDURE — 99214 OFFICE O/P EST MOD 30 MIN: CPT | Performed by: NURSE PRACTITIONER

## 2022-05-25 PROCEDURE — 80061 LIPID PANEL: CPT

## 2022-05-25 PROCEDURE — 80053 COMPREHEN METABOLIC PANEL: CPT

## 2022-05-25 PROCEDURE — 82306 VITAMIN D 25 HYDROXY: CPT

## 2022-05-25 RX ORDER — LISINOPRIL AND HYDROCHLOROTHIAZIDE 25; 20 MG/1; MG/1
1 TABLET ORAL DAILY
Qty: 90 TABLET | Refills: 1 | Status: SHIPPED | OUTPATIENT
Start: 2022-05-25 | End: 2022-11-30 | Stop reason: SDUPTHER

## 2022-05-25 RX ORDER — ATORVASTATIN CALCIUM 10 MG/1
10 TABLET, FILM COATED ORAL DAILY
Qty: 90 TABLET | Refills: 1 | Status: SHIPPED | OUTPATIENT
Start: 2022-05-25 | End: 2022-11-30 | Stop reason: SDUPTHER

## 2022-05-25 NOTE — ASSESSMENT & PLAN NOTE
Medical condition is stable.  Continue same therapy.  Will recheck at next regular appointment. Rechecking labs.

## 2022-05-25 NOTE — PROGRESS NOTES
Chief Complaint  Carlota Saez presents to [facility] for Hypertension (6 month follow up )    Subjective          History of Present Illness    Carlota is here today to follow up on hypertension. Her current cardiac medication regimen includes Lisinopril/HCTZ 20/25 daily. She does not check her blood pressure other than her clinic appointments.   Dx with hyperlipidemia; current treatment includes Atorvastatin 10 mg daily. Tolerating well.   She continues on Vitamin D supplement for Vitamin D deficiency. Notes that she is feeling well.       Review of Systems      Allergies   Allergen Reactions   • Penicillins Unknown - Low Severity     Childhood       History reviewed. No pertinent past medical history.  Current Outpatient Medications   Medication Sig Dispense Refill   • atorvastatin (LIPITOR) 10 MG tablet Take 1 tablet by mouth Daily. 90 tablet 1   • cetirizine (zyrTEC) 10 MG tablet Take 10 mg by mouth Daily.     • Cholecalciferol 50 MCG ( UT) tablet Take 2 tablets by mouth Daily. 180 tablet 1   • lisinopril-hydrochlorothiazide (PRINZIDE,ZESTORETIC) 20-25 MG per tablet Take 1 tablet by mouth Daily. 90 tablet 1     No current facility-administered medications for this visit.     Past Surgical History:   Procedure Laterality Date   •  SECTION        Social History     Tobacco Use   • Smoking status: Current Every Day Smoker     Packs/day: 1.00     Types: Cigarettes   • Smokeless tobacco: Never Used   Vaping Use   • Vaping Use: Never used   Substance Use Topics   • Alcohol use: Not Currently   • Drug use: Never     Family History   Problem Relation Age of Onset   • Lung cancer Mother      Health Maintenance Due   Topic Date Due   • COLORECTAL CANCER SCREENING  Never done   • PAP SMEAR  05/15/2022      Immunization History   Administered Date(s) Administered   • COVID-19 (MODERNA) 1st, 2nd, 3rd Dose Only 2021, 10/28/2021   • Tdap 2018        Objective  "    Vitals:    05/25/22 1022   BP: 122/87   BP Location: Left arm   Patient Position: Sitting   Pulse: 85   Temp: 98.1 °F (36.7 °C)   TempSrc: Oral   Weight: 127 kg (280 lb)   Height: 162.6 cm (64\")     Body mass index is 48.06 kg/m².     Physical Exam  Vitals reviewed.   Constitutional:       General: She is not in acute distress.     Appearance: Normal appearance. She is well-developed.   HENT:      Head: Normocephalic and atraumatic.   Cardiovascular:      Rate and Rhythm: Normal rate and regular rhythm.   Pulmonary:      Effort: Pulmonary effort is normal.      Breath sounds: Normal breath sounds.   Neurological:      Mental Status: She is alert and oriented to person, place, and time.   Psychiatric:         Mood and Affect: Mood and affect normal.           Result Review :     The following data was reviewed by: CARI Alvarez on 05/25/2022:          Hepatitis C antibody (11/30/2021 11:08)  CBC w AUTO Differential (11/30/2021 11:08)  Lipid Panel (11/30/2021 11:08)  Comprehensive Metabolic Panel (11/30/2021 11:08)  TSH (11/30/2021 11:08)                  Assessment and Plan      Diagnoses and all orders for this visit:    1. Essential (primary) hypertension (Primary)  Assessment & Plan:  Hypertension is improving with treatment.  Continue current treatment regimen.  Blood pressure will be reassessed at the next regular appointment.    Orders:  -     lisinopril-hydrochlorothiazide (PRINZIDE,ZESTORETIC) 20-25 MG per tablet; Take 1 tablet by mouth Daily.  Dispense: 90 tablet; Refill: 1    2. Mixed hyperlipidemia  Assessment & Plan:  Medical condition is stable.  Continue same therapy.  Will recheck at next regular appointment. Rechecking labs.      Orders:  -     atorvastatin (LIPITOR) 10 MG tablet; Take 1 tablet by mouth Daily.  Dispense: 90 tablet; Refill: 1  -     Comprehensive metabolic panel; Future  -     Lipid panel; Future    3. Vitamin D deficiency  Assessment & Plan:  Medical condition is stable.  " Continue same therapy.  Will recheck at next regular appointment      Orders:  -     Cholecalciferol 50 MCG (2000 UT) tablet; Take 2 tablets by mouth Daily.  Dispense: 180 tablet; Refill: 1  -     Vitamin D 25 hydroxy; Future    4. Cigarette nicotine dependence without complication  Assessment & Plan:  Tobacco use is unchanged.  Smoking cessation counseling was provided.  Tobacco use will be reassessed at the next regular appointment.      5. Encounter for screening mammogram for malignant neoplasm of breast  -     Mammo Screening Digital Tomosynthesis Bilateral With CAD; Future      Discussed due for pap smear and colorectal CA screening.         Follow Up     Return in about 6 months (around 11/25/2022) for Annual physical.

## 2022-05-26 LAB
ALBUMIN SERPL-MCNC: 4.6 G/DL (ref 3.5–5.2)
ALBUMIN/GLOB SERPL: 1.6 G/DL
ALP SERPL-CCNC: 67 U/L (ref 39–117)
ALT SERPL W P-5'-P-CCNC: 23 U/L (ref 1–33)
ANION GAP SERPL CALCULATED.3IONS-SCNC: 13 MMOL/L (ref 5–15)
AST SERPL-CCNC: 21 U/L (ref 1–32)
BILIRUB SERPL-MCNC: 0.5 MG/DL (ref 0–1.2)
BUN SERPL-MCNC: 17 MG/DL (ref 6–20)
BUN/CREAT SERPL: 18.5 (ref 7–25)
CALCIUM SPEC-SCNC: 9.6 MG/DL (ref 8.6–10.5)
CHLORIDE SERPL-SCNC: 103 MMOL/L (ref 98–107)
CHOLEST SERPL-MCNC: 172 MG/DL (ref 0–200)
CO2 SERPL-SCNC: 22 MMOL/L (ref 22–29)
CREAT SERPL-MCNC: 0.92 MG/DL (ref 0.57–1)
EGFRCR SERPLBLD CKD-EPI 2021: 77 ML/MIN/1.73
GLOBULIN UR ELPH-MCNC: 2.8 GM/DL
GLUCOSE SERPL-MCNC: 95 MG/DL (ref 65–99)
HDLC SERPL-MCNC: 54 MG/DL (ref 40–60)
LDLC SERPL CALC-MCNC: 104 MG/DL (ref 0–100)
LDLC/HDLC SERPL: 1.9 {RATIO}
POTASSIUM SERPL-SCNC: 4.3 MMOL/L (ref 3.5–5.2)
PROT SERPL-MCNC: 7.4 G/DL (ref 6–8.5)
SODIUM SERPL-SCNC: 138 MMOL/L (ref 136–145)
TRIGL SERPL-MCNC: 77 MG/DL (ref 0–150)
VLDLC SERPL-MCNC: 14 MG/DL (ref 5–40)

## 2022-11-22 DIAGNOSIS — E55.9 VITAMIN D DEFICIENCY: ICD-10-CM

## 2022-11-23 RX ORDER — CHOLECALCIFEROL (VITAMIN D3) 125 MCG
CAPSULE ORAL
Qty: 180 TABLET | Refills: 1 | Status: SHIPPED | OUTPATIENT
Start: 2022-11-23 | End: 2022-11-30 | Stop reason: SDUPTHER

## 2022-11-30 ENCOUNTER — LAB (OUTPATIENT)
Dept: LAB | Facility: HOSPITAL | Age: 47
End: 2022-11-30

## 2022-11-30 ENCOUNTER — OFFICE VISIT (OUTPATIENT)
Dept: FAMILY MEDICINE CLINIC | Age: 47
End: 2022-11-30

## 2022-11-30 VITALS
HEIGHT: 64 IN | HEART RATE: 74 BPM | BODY MASS INDEX: 48.65 KG/M2 | DIASTOLIC BLOOD PRESSURE: 48 MMHG | SYSTOLIC BLOOD PRESSURE: 131 MMHG | WEIGHT: 285 LBS | TEMPERATURE: 98.5 F

## 2022-11-30 DIAGNOSIS — Z23 ENCOUNTER FOR IMMUNIZATION: ICD-10-CM

## 2022-11-30 DIAGNOSIS — E78.2 MIXED HYPERLIPIDEMIA: ICD-10-CM

## 2022-11-30 DIAGNOSIS — Z12.31 ENCOUNTER FOR SCREENING MAMMOGRAM FOR MALIGNANT NEOPLASM OF BREAST: ICD-10-CM

## 2022-11-30 DIAGNOSIS — F17.210 CIGARETTE NICOTINE DEPENDENCE WITHOUT COMPLICATION: ICD-10-CM

## 2022-11-30 DIAGNOSIS — Z00.00 ANNUAL PHYSICAL EXAM: Primary | ICD-10-CM

## 2022-11-30 DIAGNOSIS — E55.9 VITAMIN D DEFICIENCY: ICD-10-CM

## 2022-11-30 DIAGNOSIS — Z00.00 ANNUAL PHYSICAL EXAM: ICD-10-CM

## 2022-11-30 DIAGNOSIS — I10 ESSENTIAL (PRIMARY) HYPERTENSION: ICD-10-CM

## 2022-11-30 DIAGNOSIS — Z12.12 SCREENING FOR COLORECTAL CANCER: ICD-10-CM

## 2022-11-30 DIAGNOSIS — Z12.11 SCREENING FOR COLORECTAL CANCER: ICD-10-CM

## 2022-11-30 LAB
ALBUMIN SERPL-MCNC: 4.5 G/DL (ref 3.5–5.2)
ALBUMIN/GLOB SERPL: 1.5 G/DL
ALP SERPL-CCNC: 61 U/L (ref 39–117)
ALT SERPL W P-5'-P-CCNC: 20 U/L (ref 1–33)
ANION GAP SERPL CALCULATED.3IONS-SCNC: 9 MMOL/L (ref 5–15)
AST SERPL-CCNC: 17 U/L (ref 1–32)
BASOPHILS # BLD AUTO: 0.02 10*3/MM3 (ref 0–0.2)
BASOPHILS NFR BLD AUTO: 0.4 % (ref 0–1.5)
BILIRUB SERPL-MCNC: 0.6 MG/DL (ref 0–1.2)
BUN SERPL-MCNC: 16 MG/DL (ref 6–20)
BUN/CREAT SERPL: 19 (ref 7–25)
CALCIUM SPEC-SCNC: 9.7 MG/DL (ref 8.6–10.5)
CHLORIDE SERPL-SCNC: 102 MMOL/L (ref 98–107)
CO2 SERPL-SCNC: 28 MMOL/L (ref 22–29)
CREAT SERPL-MCNC: 0.84 MG/DL (ref 0.57–1)
DEPRECATED RDW RBC AUTO: 43.7 FL (ref 37–54)
EGFRCR SERPLBLD CKD-EPI 2021: 85.8 ML/MIN/1.73
EOSINOPHIL # BLD AUTO: 0.06 10*3/MM3 (ref 0–0.4)
EOSINOPHIL NFR BLD AUTO: 1.1 % (ref 0.3–6.2)
ERYTHROCYTE [DISTWIDTH] IN BLOOD BY AUTOMATED COUNT: 12.8 % (ref 12.3–15.4)
GLOBULIN UR ELPH-MCNC: 3.1 GM/DL
GLUCOSE SERPL-MCNC: 93 MG/DL (ref 65–99)
HCT VFR BLD AUTO: 43.3 % (ref 34–46.6)
HGB BLD-MCNC: 14.6 G/DL (ref 12–15.9)
IMM GRANULOCYTES # BLD AUTO: 0.01 10*3/MM3 (ref 0–0.05)
IMM GRANULOCYTES NFR BLD AUTO: 0.2 % (ref 0–0.5)
LYMPHOCYTES # BLD AUTO: 0.7 10*3/MM3 (ref 0.7–3.1)
LYMPHOCYTES NFR BLD AUTO: 12.7 % (ref 19.6–45.3)
MCH RBC QN AUTO: 30.9 PG (ref 26.6–33)
MCHC RBC AUTO-ENTMCNC: 33.7 G/DL (ref 31.5–35.7)
MCV RBC AUTO: 91.7 FL (ref 79–97)
MONOCYTES # BLD AUTO: 0.39 10*3/MM3 (ref 0.1–0.9)
MONOCYTES NFR BLD AUTO: 7.1 % (ref 5–12)
NEUTROPHILS NFR BLD AUTO: 4.34 10*3/MM3 (ref 1.7–7)
NEUTROPHILS NFR BLD AUTO: 78.5 % (ref 42.7–76)
PLATELET # BLD AUTO: 213 10*3/MM3 (ref 140–450)
PMV BLD AUTO: 10 FL (ref 6–12)
POTASSIUM SERPL-SCNC: 4.5 MMOL/L (ref 3.5–5.2)
PROT SERPL-MCNC: 7.6 G/DL (ref 6–8.5)
RBC # BLD AUTO: 4.72 10*6/MM3 (ref 3.77–5.28)
SODIUM SERPL-SCNC: 139 MMOL/L (ref 136–145)
WBC NRBC COR # BLD: 5.52 10*3/MM3 (ref 3.4–10.8)

## 2022-11-30 PROCEDURE — 36415 COLL VENOUS BLD VENIPUNCTURE: CPT

## 2022-11-30 PROCEDURE — 90471 IMMUNIZATION ADMIN: CPT | Performed by: NURSE PRACTITIONER

## 2022-11-30 PROCEDURE — 85025 COMPLETE CBC W/AUTO DIFF WBC: CPT

## 2022-11-30 PROCEDURE — 80053 COMPREHEN METABOLIC PANEL: CPT

## 2022-11-30 PROCEDURE — 99396 PREV VISIT EST AGE 40-64: CPT | Performed by: NURSE PRACTITIONER

## 2022-11-30 PROCEDURE — 90677 PCV20 VACCINE IM: CPT | Performed by: NURSE PRACTITIONER

## 2022-11-30 RX ORDER — LISINOPRIL AND HYDROCHLOROTHIAZIDE 25; 20 MG/1; MG/1
1 TABLET ORAL DAILY
Qty: 90 TABLET | Refills: 1 | Status: SHIPPED | OUTPATIENT
Start: 2022-11-30

## 2022-11-30 RX ORDER — CHOLECALCIFEROL (VITAMIN D3) 125 MCG
2 CAPSULE ORAL DAILY
Qty: 180 TABLET | Refills: 1 | Status: SHIPPED | OUTPATIENT
Start: 2022-11-30

## 2022-11-30 RX ORDER — ATORVASTATIN CALCIUM 10 MG/1
10 TABLET, FILM COATED ORAL DAILY
Qty: 90 TABLET | Refills: 1 | Status: SHIPPED | OUTPATIENT
Start: 2022-11-30

## 2022-11-30 RX ORDER — BUPROPION HYDROCHLORIDE 100 MG/1
100 TABLET, EXTENDED RELEASE ORAL 2 TIMES DAILY
Qty: 180 TABLET | Refills: 0 | Status: SHIPPED | OUTPATIENT
Start: 2022-11-30

## 2022-11-30 NOTE — PROGRESS NOTES
Chief Complaint  Carlota Saez presents to [facility] for Annual Exam    Subjective          History of Present Illness    Carlota is here today for annual preventive exam.   Has received covid vaccine X 2.   Declines influenza, PNA vaccines.   UTD Tdap vaccine.  UTD pap smear. 5/15/2019 normal. HPV normal.   Last mammogram 4//24/2019 normal. She would like to have scheduled.   Has not had screening colonoscopy. Declines to schedule at this time.   Currently smoking 1 ppd. She is interested in quitting.    She is also following up on hypertension. Her current cardiac medication regimen includes Lisinopril/HCTZ 20/25 daily. She does not check her blood pressure other than her clinic appointments.   Dx with hyperlipidemia; current treatment includes Atorvastatin 10 mg daily. Tolerating well.   She continues on Vitamin D supplement for Vitamin D deficiency. Notes that she is feeling well.      Review of Systems   Constitutional: Negative for chills and fever.   HENT: Negative for ear pain and sore throat.    Eyes: Negative for blurred vision and redness.   Respiratory: Negative for cough, shortness of breath and wheezing.    Cardiovascular: Negative for chest pain and palpitations.   Gastrointestinal: Negative for abdominal pain, constipation, diarrhea, nausea and vomiting.   Genitourinary: Negative for dysuria, frequency and urgency.   Musculoskeletal: Negative for back pain and joint swelling.   Skin: Negative for rash.   Neurological: Negative for dizziness and headache.   Psychiatric/Behavioral: Negative for self-injury and suicidal ideas.         Allergies   Allergen Reactions   • Penicillins Unknown - Low Severity     Childhood       History reviewed. No pertinent past medical history.  Current Outpatient Medications   Medication Sig Dispense Refill   • atorvastatin (LIPITOR) 10 MG tablet Take 1 tablet by mouth Daily. 90 tablet 1   • cetirizine (zyrTEC) 10 MG tablet Take 10 mg  "by mouth Daily.     • Cholecalciferol (Vitamin D3) 50 MCG (2000 UT) tablet Take 2 tablets by mouth Daily. 180 tablet 1   • lisinopril-hydrochlorothiazide (PRINZIDE,ZESTORETIC) 20-25 MG per tablet Take 1 tablet by mouth Daily. 90 tablet 1   • buPROPion SR (WELLBUTRIN SR) 100 MG 12 hr tablet Take 1 tablet by mouth 2 (Two) Times a Day. 180 tablet 0     No current facility-administered medications for this visit.     Past Surgical History:   Procedure Laterality Date   •  SECTION        Social History     Tobacco Use   • Smoking status: Every Day     Packs/day: 1.00     Types: Cigarettes   • Smokeless tobacco: Never   Vaping Use   • Vaping Use: Never used   Substance Use Topics   • Alcohol use: Not Currently   • Drug use: Never     Family History   Problem Relation Age of Onset   • Lung cancer Mother      Health Maintenance Due   Topic Date Due   • COLORECTAL CANCER SCREENING  Never done   • PAP SMEAR  05/15/2022   • ANNUAL PHYSICAL  2022      Immunization History   Administered Date(s) Administered   • COVID-19 (MODERNA) 1st, 2nd, 3rd Dose Only 2021, 10/28/2021   • Pneumococcal Conjugate 20-Valent (PCV20) 2022   • Tdap 2018        Objective     Vitals:    22 1110   BP: 131/48   BP Location: Left arm   Patient Position: Sitting   Pulse: 74   Temp: 98.5 °F (36.9 °C)   TempSrc: Oral   Weight: 129 kg (285 lb)   Height: 162.6 cm (64\")     Body mass index is 48.92 kg/m².     Physical Exam  Vitals reviewed.   Constitutional:       General: She is not in acute distress.     Appearance: Normal appearance. She is well-developed.   HENT:      Head: Normocephalic and atraumatic.      Right Ear: Hearing, tympanic membrane and ear canal normal.      Left Ear: Hearing, tympanic membrane and ear canal normal.      Mouth/Throat:      Mouth: Mucous membranes are moist.   Eyes:      Extraocular Movements: Extraocular movements intact.      Pupils: Pupils are equal, round, and reactive to light. "   Cardiovascular:      Rate and Rhythm: Normal rate and regular rhythm.      Pulses: Normal pulses.      Heart sounds: Normal heart sounds.   Pulmonary:      Effort: Pulmonary effort is normal.      Breath sounds: Normal breath sounds.   Abdominal:      General: Bowel sounds are normal.      Palpations: Abdomen is soft.      Tenderness: There is no abdominal tenderness.   Musculoskeletal:         General: Normal range of motion.      Cervical back: Normal range of motion and neck supple.   Skin:     General: Skin is warm and dry.   Neurological:      Mental Status: She is alert and oriented to person, place, and time.   Psychiatric:         Mood and Affect: Mood normal.           Result Review :     The following data was reviewed by: CARI Alvarez on 11/30/2022:          Lipid panel (05/25/2022 10:58)  Comprehensive metabolic panel (05/25/2022 10:58)  Vitamin D 25 hydroxy (05/25/2022 10:58)                  Assessment and Plan      Diagnoses and all orders for this visit:    1. Annual physical exam (Primary)  Comments:  Appropriate screenings and vaccinations were reviewed with the pt and offered as indicated.  Pt counseled on healthy lifestyle including healthy diet, exercise.  Orders:  -     Comprehensive metabolic panel; Future  -     CBC w AUTO Differential; Future    2. Encounter for immunization  -     Pneumococcal Conjugate Vaccine 20-Valent (PCV20)    3. Cigarette nicotine dependence without complication  Assessment & Plan:  Tobacco use is unchanged.  Smoking cessation counseling was provided.  Tobacco use will be reassessed at the next regular appointment.    Orders:  -     buPROPion SR (WELLBUTRIN SR) 100 MG 12 hr tablet; Take 1 tablet by mouth 2 (Two) Times a Day.  Dispense: 180 tablet; Refill: 0    4. Encounter for screening mammogram for malignant neoplasm of breast  -     Mammo Screening Digital Tomosynthesis Bilateral With CAD; Future    5. Essential (primary) hypertension  Comments:  Medical  condition is stable.  Continue same therapy.  Will recheck at next regular appointment.  Orders:  -     lisinopril-hydrochlorothiazide (PRINZIDE,ZESTORETIC) 20-25 MG per tablet; Take 1 tablet by mouth Daily.  Dispense: 90 tablet; Refill: 1    6. Mixed hyperlipidemia  Comments:  Medical condition is stable.  Continue same therapy.  Will recheck at next regular appointment.  Orders:  -     atorvastatin (LIPITOR) 10 MG tablet; Take 1 tablet by mouth Daily.  Dispense: 90 tablet; Refill: 1    7. Vitamin D deficiency  Comments:  Medical condition is stable.  Continue same therapy.  Will recheck at next regular appointment.  Orders:  -     Cholecalciferol (Vitamin D3) 50 MCG (2000 UT) tablet; Take 2 tablets by mouth Daily.  Dispense: 180 tablet; Refill: 1    8. Screening for colorectal cancer  -     Ambulatory Referral to General Surgery            Follow Up     Return in about 10 weeks (around 2/8/2023) for well woman exam.

## 2022-12-07 ENCOUNTER — HOSPITAL ENCOUNTER (OUTPATIENT)
Dept: OTHER | Facility: HOSPITAL | Age: 47
Discharge: HOME | End: 2022-12-07

## 2022-12-12 DIAGNOSIS — Z12.31 ENCOUNTER FOR SCREENING MAMMOGRAM FOR MALIGNANT NEOPLASM OF BREAST: ICD-10-CM

## 2024-05-03 ENCOUNTER — OFFICE VISIT (OUTPATIENT)
Dept: FAMILY MEDICINE CLINIC | Age: 49
End: 2024-05-03
Payer: COMMERCIAL

## 2024-05-03 ENCOUNTER — HOSPITAL ENCOUNTER (OUTPATIENT)
Dept: GENERAL RADIOLOGY | Facility: HOSPITAL | Age: 49
Discharge: HOME | End: 2024-05-03
Payer: COMMERCIAL

## 2024-05-03 VITALS
SYSTOLIC BLOOD PRESSURE: 133 MMHG | WEIGHT: 282 LBS | BODY MASS INDEX: 48.14 KG/M2 | HEIGHT: 64 IN | HEART RATE: 66 BPM | OXYGEN SATURATION: 98 % | DIASTOLIC BLOOD PRESSURE: 82 MMHG | TEMPERATURE: 98.2 F

## 2024-05-03 DIAGNOSIS — M25.562 ACUTE PAIN OF LEFT KNEE: ICD-10-CM

## 2024-05-03 DIAGNOSIS — M25.562 ACUTE PAIN OF LEFT KNEE: Primary | ICD-10-CM

## 2024-05-03 PROCEDURE — 99213 OFFICE O/P EST LOW 20 MIN: CPT

## 2024-05-03 PROCEDURE — 73562 X-RAY EXAM OF KNEE 3: CPT

## 2024-05-03 NOTE — PROGRESS NOTES
"Subjective     CHIEF COMPLAINT    Chief Complaint   Patient presents with    Knee Pain     At home treatments aren't working (ice, elevation) left knee      History of Present Illness  Patient is a 49 year old female, presenting to the clinic today with complaints of left knee pain.  Pain started about a week ago.  Denies any falls, trauma or injuries.  She has tried an ice pack, Biofreeze, elevation and ibuprofen at home.  Denies any previous injuries or surgeries to the left knee.  Describes the pain as aching and sharp.  No swelling noted.  No wounds or redness.    Review of Systems   Constitutional:  Negative for chills and fever.   Musculoskeletal:  Positive for arthralgias. Negative for joint swelling.   Skin:  Negative for color change, rash and wound.       Current Outpatient Medications on File Prior to Visit   Medication Sig Dispense Refill    [DISCONTINUED] atorvastatin (LIPITOR) 10 MG tablet Take 1 tablet by mouth Daily. (Patient not taking: Reported on 5/3/2024) 90 tablet 1    [DISCONTINUED] buPROPion SR (WELLBUTRIN SR) 100 MG 12 hr tablet Take 1 tablet by mouth 2 (Two) Times a Day. (Patient not taking: Reported on 5/3/2024) 180 tablet 0    [DISCONTINUED] cetirizine (zyrTEC) 10 MG tablet Take 10 mg by mouth Daily. (Patient not taking: Reported on 5/3/2024)      [DISCONTINUED] Cholecalciferol (Vitamin D3) 50 MCG (2000 UT) tablet Take 2 tablets by mouth Daily. (Patient not taking: Reported on 5/3/2024) 180 tablet 1    [DISCONTINUED] lisinopril-hydrochlorothiazide (PRINZIDE,ZESTORETIC) 20-25 MG per tablet Take 1 tablet by mouth Daily. (Patient not taking: Reported on 5/3/2024) 90 tablet 1     No current facility-administered medications on file prior to visit.     /82 (BP Location: Right arm, Patient Position: Sitting, Cuff Size: Adult) Comment (BP Location): wrist  Pulse 66   Temp 98.2 °F (36.8 °C) (Oral)   Ht 162.6 cm (64\")   Wt 128 kg (282 lb)   SpO2 98%   BMI 48.41 kg/m²     Objective "     Physical Exam  Vitals and nursing note reviewed.   Constitutional:       General: She is not in acute distress.     Appearance: Normal appearance. She is not ill-appearing.   HENT:      Head: Normocephalic.      Mouth/Throat:      Lips: Pink.   Pulmonary:      Effort: Pulmonary effort is normal. No accessory muscle usage or respiratory distress.   Musculoskeletal:      Cervical back: Normal range of motion.      Left knee: Crepitus present. No swelling, deformity, erythema or ecchymosis. Normal range of motion. Tenderness present over the lateral joint line.   Skin:     General: Skin is warm and dry.   Neurological:      General: No focal deficit present.      Mental Status: She is alert and oriented to person, place, and time.   Psychiatric:         Mood and Affect: Mood and affect normal.         Behavior: Behavior normal.          XR Knee 3 View Left    Result Date: 5/3/2024  XR KNEE 3 VW LEFT-  Date of Exam: 5/3/2024 10:46 AM  Indication: left knee pain; M25.562-Pain in left knee  Comparison: None available.  Findings: Minimal loss of height in the medial compartment of the joint space without significant associated degenerative change. There is no acute fracture or dislocation. No significant joint effusion. Evaluation of soft tissue swelling limited by the patient's body habitus      Impression: No acute osseous abnormality   Electronically Signed By-Austin Hernández On:5/3/2024 10:59 AM              Diagnoses and all orders for this visit:    1. Acute pain of left knee (Primary)  -     XR Knee 3 View Left; Future  -     diclofenac (VOLTAREN) 50 MG EC tablet; Take 1 tablet by mouth 2 (Two) Times a Day As Needed (pain).  Dispense: 14 tablet; Refill: 0    No concerning findings on physical exam today. We will check an x ray of the left knee.  Will also start patient on some diclofenac.  Recommend rest, ice, elevation and compression.  Patient educated on side effects of diclofenac.    Patient is also due for  an appointment with her PCP.  She has not taken any of her chronically prescribed medications in some time.  Appointment scheduled for follow-up with PCP.    Addendum: X-ray shows no acute findings.  Continue with plan of care as discussed.       Follow up:  Return in about 2 weeks (around 5/17/2024) for with PCP.  Patient was given instructions and counseling regarding her condition or for health maintenance advice. Please see specific information pulled into the AVS if appropriate.

## 2024-05-03 NOTE — LETTER
May 3, 2024     Patient: Carlota Saez   YOB: 1975   Date of Visit: 5/3/2024       To Whom It May Concern:      Patient was seen in my office on 5/3/24. She can return to work on 5/8/2024         Sincerely,  CARI Kay

## 2024-06-21 ENCOUNTER — TELEPHONE (OUTPATIENT)
Dept: FAMILY MEDICINE CLINIC | Age: 49
End: 2024-06-21
Payer: COMMERCIAL

## 2024-06-21 DIAGNOSIS — I10 ESSENTIAL (PRIMARY) HYPERTENSION: Primary | ICD-10-CM

## 2024-06-21 RX ORDER — LISINOPRIL AND HYDROCHLOROTHIAZIDE 25; 20 MG/1; MG/1
1 TABLET ORAL DAILY
Qty: 30 TABLET | Refills: 0 | Status: SHIPPED | OUTPATIENT
Start: 2024-06-21

## 2024-06-21 NOTE — TELEPHONE ENCOUNTER
How long has she been out of her medication? May not want to restart at a higher dose if has been out for some time

## 2024-06-21 NOTE — TELEPHONE ENCOUNTER
Caller: Carlota Saez    Relationship: Self    Best call back number: [phone]     What medication are you requesting: lisinopril-hydrochlorothiazide (PRINZIDE,ZESTORETIC) 20-25 MG per tablet     If a prescription is needed, what is your preferred pharmacy and phone number: [facility] - [place], [place] - [address] [facility] [address] [facility] [phone]  - [phone] FX     Additional notes: PATIENT IS SCHEDULED TO BE SEEN 7.10.2024 HOWEVER IS CURRENTLY OUT OF MEDICATION. PLEASE ADVISE IF CAN BE PRESCRIBED.

## 2024-07-10 ENCOUNTER — OFFICE VISIT (OUTPATIENT)
Dept: FAMILY MEDICINE CLINIC | Age: 49
End: 2024-07-10
Payer: COMMERCIAL

## 2024-07-10 VITALS
SYSTOLIC BLOOD PRESSURE: 129 MMHG | TEMPERATURE: 98 F | HEART RATE: 70 BPM | DIASTOLIC BLOOD PRESSURE: 89 MMHG | HEIGHT: 64 IN | OXYGEN SATURATION: 97 % | BODY MASS INDEX: 47.7 KG/M2 | WEIGHT: 279.4 LBS

## 2024-07-10 DIAGNOSIS — I10 ESSENTIAL (PRIMARY) HYPERTENSION: Primary | ICD-10-CM

## 2024-07-10 DIAGNOSIS — Z12.12 SCREENING FOR COLORECTAL CANCER: ICD-10-CM

## 2024-07-10 DIAGNOSIS — E78.2 MIXED HYPERLIPIDEMIA: ICD-10-CM

## 2024-07-10 DIAGNOSIS — F17.210 CIGARETTE NICOTINE DEPENDENCE WITHOUT COMPLICATION: ICD-10-CM

## 2024-07-10 DIAGNOSIS — Z12.11 SCREENING FOR COLORECTAL CANCER: ICD-10-CM

## 2024-07-10 PROCEDURE — 99214 OFFICE O/P EST MOD 30 MIN: CPT | Performed by: NURSE PRACTITIONER

## 2024-07-10 RX ORDER — LISINOPRIL AND HYDROCHLOROTHIAZIDE 25; 20 MG/1; MG/1
1 TABLET ORAL DAILY
Qty: 90 TABLET | Refills: 1 | Status: SHIPPED | OUTPATIENT
Start: 2024-07-10

## 2024-07-10 NOTE — ASSESSMENT & PLAN NOTE
Patient has been educated on the risk of diseases from using tobacco products such as cancer, COPD, and heart disease and has been advised to quit. I spent less than 3 minutes counseling the patient.      Discussed that she is due for pap smear.

## 2024-07-10 NOTE — PROGRESS NOTES
Chief Complaint  Carlota Saez presents to [facility] for Hypertension    Subjective          History of Present Illness    Carlota is here today to follow up on her blood pressure. I have not seen her in clinic since 2022. She has been taking her blood pressure medication intermittently since then. She has taken consistently for the past month.   She was also previously on atorvastatin for hyperlipidemia but no longer taking.     Review of Systems      Allergies   Allergen Reactions    Penicillins Unknown - Low Severity     Childhood       History reviewed. No pertinent past medical history.  Current Outpatient Medications   Medication Sig Dispense Refill    lisinopril-hydrochlorothiazide (PRINZIDE,ZESTORETIC) 20-25 MG per tablet Take 1 tablet by mouth Daily. 90 tablet 1     No current facility-administered medications for this visit.     Past Surgical History:   Procedure Laterality Date     SECTION        Social History     Tobacco Use    Smoking status: Every Day     Current packs/day: 1.00     Types: Cigarettes     Passive exposure: Never    Smokeless tobacco: Never   Vaping Use    Vaping status: Never Used   Substance Use Topics    Alcohol use: Not Currently    Drug use: Never     Family History   Problem Relation Age of Onset    Lung cancer Mother      Health Maintenance Due   Topic Date Due    COLORECTAL CANCER SCREENING  Never done    PAP SMEAR  05/15/2022    LIPID PANEL  2023    ANNUAL PHYSICAL  2023      Immunization History   Administered Date(s) Administered    COVID-19 (MODERNA) 1st,2nd,3rd Dose Monovalent 2021, 10/28/2021    Pneumococcal Conjugate 20-Valent (PCV20) 2022    Tdap 2018        Objective     Vitals:    07/10/24 1030   BP: 129/89   BP Location: Left arm   Patient Position: Sitting   Cuff Size: Large Adult   Pulse: 70   Temp: 98 °F (36.7 °C)   TempSrc: Oral   SpO2: 97%   Weight: 127 kg (279 lb 6.4 oz)   Height:  "162.6 cm (64\")     Body mass index is 47.96 kg/m².     Class 3 Severe Obesity (BMI >=40). Obesity-related health conditions include the following: hypertension. Obesity is unchanged. BMI is is above average; BMI management plan is completed. We discussed portion control and increasing exercise.            No results found.    Physical Exam  Vitals reviewed.   Constitutional:       General: She is not in acute distress.     Appearance: Normal appearance. She is well-developed.   HENT:      Head: Normocephalic and atraumatic.   Cardiovascular:      Rate and Rhythm: Normal rate and regular rhythm.   Pulmonary:      Effort: Pulmonary effort is normal.      Breath sounds: Normal breath sounds.   Neurological:      Mental Status: She is alert and oriented to person, place, and time.   Psychiatric:         Mood and Affect: Mood and affect normal.           Result Review :                               Assessment and Plan      Assessment & Plan  Essential (primary) hypertension    Medical condition is stable.  Continue same therapy.  Will recheck at next regular appointment    Mixed hyperlipidemia     She will return for fasting labs. May need to restart statin medication.   Screening for colorectal cancer  Agreeable to [facility]. Ordered.   Cigarette nicotine dependence without complication      Patient has been educated on the risk of diseases from using tobacco products such as cancer, COPD, and heart disease and has been advised to quit. I spent less than 3 minutes counseling the patient.      Discussed that she is due for pap smear.                                   Orders Placed This Encounter   Procedures    Cologuard - Stool, Per Rectum    Comprehensive metabolic panel    Lipid panel     New Medications Ordered This Visit   Medications    lisinopril-hydrochlorothiazide (PRINZIDE,ZESTORETIC) 20-25 MG per tablet     Sig: Take 1 tablet by mouth Daily.     Dispense:  90 tablet     Refill:  1                 Follow Up "     Return in about 6 months (around 1/10/2025) for Annual physical.

## 2024-07-17 ENCOUNTER — LAB (OUTPATIENT)
Dept: LAB | Facility: HOSPITAL | Age: 49
End: 2024-07-17
Payer: COMMERCIAL

## 2024-07-17 DIAGNOSIS — I10 ESSENTIAL (PRIMARY) HYPERTENSION: ICD-10-CM

## 2024-07-17 LAB
ALBUMIN SERPL-MCNC: 4.5 G/DL (ref 3.5–5.2)
ALBUMIN/GLOB SERPL: 1.4 G/DL
ALP SERPL-CCNC: 63 U/L (ref 39–117)
ALT SERPL W P-5'-P-CCNC: 16 U/L (ref 1–33)
ANION GAP SERPL CALCULATED.3IONS-SCNC: 10 MMOL/L (ref 5–15)
AST SERPL-CCNC: 21 U/L (ref 1–32)
BILIRUB SERPL-MCNC: 0.7 MG/DL (ref 0–1.2)
BUN SERPL-MCNC: 14 MG/DL (ref 6–20)
BUN/CREAT SERPL: 19.2 (ref 7–25)
CALCIUM SPEC-SCNC: 9.6 MG/DL (ref 8.6–10.5)
CHLORIDE SERPL-SCNC: 101 MMOL/L (ref 98–107)
CHOLEST SERPL-MCNC: 222 MG/DL (ref 0–200)
CO2 SERPL-SCNC: 24 MMOL/L (ref 22–29)
CREAT SERPL-MCNC: 0.73 MG/DL (ref 0.57–1)
EGFRCR SERPLBLD CKD-EPI 2021: 100.3 ML/MIN/1.73
GLOBULIN UR ELPH-MCNC: 3.2 GM/DL
GLUCOSE SERPL-MCNC: 103 MG/DL (ref 65–99)
HDLC SERPL-MCNC: 59 MG/DL (ref 40–60)
LDLC SERPL CALC-MCNC: 147 MG/DL (ref 0–100)
LDLC/HDLC SERPL: 2.46 {RATIO}
POTASSIUM SERPL-SCNC: 3.9 MMOL/L (ref 3.5–5.2)
PROT SERPL-MCNC: 7.7 G/DL (ref 6–8.5)
SODIUM SERPL-SCNC: 135 MMOL/L (ref 136–145)
TRIGL SERPL-MCNC: 88 MG/DL (ref 0–150)
VLDLC SERPL-MCNC: 16 MG/DL (ref 5–40)

## 2024-07-17 PROCEDURE — 36415 COLL VENOUS BLD VENIPUNCTURE: CPT

## 2024-07-17 PROCEDURE — 80053 COMPREHEN METABOLIC PANEL: CPT

## 2024-07-17 PROCEDURE — 80061 LIPID PANEL: CPT

## 2024-07-18 DIAGNOSIS — E78.2 MIXED HYPERLIPIDEMIA: Primary | ICD-10-CM

## 2024-07-18 RX ORDER — ATORVASTATIN CALCIUM 10 MG/1
10 TABLET, FILM COATED ORAL DAILY
Qty: 90 TABLET | Refills: 1 | Status: SHIPPED | OUTPATIENT
Start: 2024-07-18

## 2025-02-25 DIAGNOSIS — E78.2 MIXED HYPERLIPIDEMIA: ICD-10-CM

## 2025-02-25 DIAGNOSIS — I10 ESSENTIAL (PRIMARY) HYPERTENSION: ICD-10-CM

## 2025-02-25 RX ORDER — LISINOPRIL AND HYDROCHLOROTHIAZIDE 20; 25 MG/1; MG/1
1 TABLET ORAL DAILY
Qty: 90 TABLET | Refills: 0 | Status: SHIPPED | OUTPATIENT
Start: 2025-02-25

## 2025-02-25 RX ORDER — ATORVASTATIN CALCIUM 10 MG/1
10 TABLET, FILM COATED ORAL DAILY
Qty: 90 TABLET | Refills: 0 | Status: SHIPPED | OUTPATIENT
Start: 2025-02-25

## 2025-02-25 NOTE — TELEPHONE ENCOUNTER
Caller: Carlota Saez    Relationship: Self    Best call back number: [phone]     Requested Prescriptions:   Requested Prescriptions     Pending Prescriptions Disp Refills    lisinopril-hydrochlorothiazide (PRINZIDE,ZESTORETIC) 20-25 MG per tablet 90 tablet 1     Sig: Take 1 tablet by mouth Daily.    atorvastatin (LIPITOR) 10 MG tablet 90 tablet 1     Sig: Take 1 tablet by mouth Daily.        Pharmacy where request should be sent: [facility] [address] [facility] - [phone]  - [phone]      Last office visit with prescribing clinician: 7/10/2024   Last telemedicine visit with prescribing clinician: Visit date not found   Next office visit with prescribing clinician: 4/16/2025     Additional details provided by patient:     Does the patient have less than a 3 day supply:  [] Yes  [x] No    Kristal Duke, PCT   02/25/25 13:30 EST

## 2025-04-16 ENCOUNTER — LAB (OUTPATIENT)
Dept: LAB | Facility: HOSPITAL | Age: 50
End: 2025-04-16
Payer: COMMERCIAL

## 2025-04-16 ENCOUNTER — OFFICE VISIT (OUTPATIENT)
Dept: FAMILY MEDICINE CLINIC | Age: 50
End: 2025-04-16
Payer: COMMERCIAL

## 2025-04-16 VITALS
SYSTOLIC BLOOD PRESSURE: 127 MMHG | HEIGHT: 64 IN | DIASTOLIC BLOOD PRESSURE: 58 MMHG | TEMPERATURE: 98.2 F | OXYGEN SATURATION: 99 % | HEART RATE: 70 BPM | WEIGHT: 292.8 LBS | BODY MASS INDEX: 49.99 KG/M2

## 2025-04-16 DIAGNOSIS — Z12.31 SCREENING MAMMOGRAM FOR BREAST CANCER: ICD-10-CM

## 2025-04-16 DIAGNOSIS — I10 ESSENTIAL (PRIMARY) HYPERTENSION: ICD-10-CM

## 2025-04-16 DIAGNOSIS — E78.2 MIXED HYPERLIPIDEMIA: ICD-10-CM

## 2025-04-16 DIAGNOSIS — Z00.00 ANNUAL PHYSICAL EXAM: ICD-10-CM

## 2025-04-16 DIAGNOSIS — Z00.00 ANNUAL PHYSICAL EXAM: Primary | ICD-10-CM

## 2025-04-16 LAB
ALBUMIN SERPL-MCNC: 4.3 G/DL (ref 3.5–5.2)
ALBUMIN/GLOB SERPL: 1.2 G/DL
ALP SERPL-CCNC: 57 U/L (ref 39–117)
ALT SERPL W P-5'-P-CCNC: 19 U/L (ref 1–33)
ANION GAP SERPL CALCULATED.3IONS-SCNC: 11.9 MMOL/L (ref 5–15)
AST SERPL-CCNC: 21 U/L (ref 1–32)
BILIRUB SERPL-MCNC: 0.7 MG/DL (ref 0–1.2)
BUN SERPL-MCNC: 13 MG/DL (ref 6–20)
BUN/CREAT SERPL: 16.9 (ref 7–25)
CALCIUM SPEC-SCNC: 9.9 MG/DL (ref 8.6–10.5)
CHLORIDE SERPL-SCNC: 101 MMOL/L (ref 98–107)
CHOLEST SERPL-MCNC: 168 MG/DL (ref 0–200)
CO2 SERPL-SCNC: 25.1 MMOL/L (ref 22–29)
CREAT SERPL-MCNC: 0.77 MG/DL (ref 0.57–1)
DEPRECATED RDW RBC AUTO: 44.6 FL (ref 37–54)
EGFRCR SERPLBLD CKD-EPI 2021: 94.1 ML/MIN/1.73
ERYTHROCYTE [DISTWIDTH] IN BLOOD BY AUTOMATED COUNT: 13 % (ref 12.3–15.4)
GLOBULIN UR ELPH-MCNC: 3.7 GM/DL
GLUCOSE SERPL-MCNC: 101 MG/DL (ref 65–99)
HCT VFR BLD AUTO: 42.9 % (ref 34–46.6)
HDLC SERPL-MCNC: 59 MG/DL (ref 40–60)
HGB BLD-MCNC: 14.2 G/DL (ref 12–15.9)
LDLC SERPL CALC-MCNC: 94 MG/DL (ref 0–100)
LDLC/HDLC SERPL: 1.58 {RATIO}
MCH RBC QN AUTO: 30.7 PG (ref 26.6–33)
MCHC RBC AUTO-ENTMCNC: 33.1 G/DL (ref 31.5–35.7)
MCV RBC AUTO: 92.7 FL (ref 79–97)
PLATELET # BLD AUTO: 273 10*3/MM3 (ref 140–450)
PMV BLD AUTO: 10.7 FL (ref 6–12)
POTASSIUM SERPL-SCNC: 4.1 MMOL/L (ref 3.5–5.2)
PROT SERPL-MCNC: 8 G/DL (ref 6–8.5)
RBC # BLD AUTO: 4.63 10*6/MM3 (ref 3.77–5.28)
SODIUM SERPL-SCNC: 138 MMOL/L (ref 136–145)
TRIGL SERPL-MCNC: 78 MG/DL (ref 0–150)
VLDLC SERPL-MCNC: 15 MG/DL (ref 5–40)
WBC NRBC COR # BLD AUTO: 6.72 10*3/MM3 (ref 3.4–10.8)

## 2025-04-16 PROCEDURE — 36415 COLL VENOUS BLD VENIPUNCTURE: CPT

## 2025-04-16 PROCEDURE — 85027 COMPLETE CBC AUTOMATED: CPT

## 2025-04-16 PROCEDURE — 80053 COMPREHEN METABOLIC PANEL: CPT

## 2025-04-16 PROCEDURE — 80061 LIPID PANEL: CPT

## 2025-04-16 RX ORDER — LISINOPRIL AND HYDROCHLOROTHIAZIDE 20; 25 MG/1; MG/1
1 TABLET ORAL DAILY
Qty: 90 TABLET | Refills: 1 | Status: SHIPPED | OUTPATIENT
Start: 2025-04-16

## 2025-04-16 RX ORDER — ATORVASTATIN CALCIUM 10 MG/1
10 TABLET, FILM COATED ORAL DAILY
Qty: 90 TABLET | Refills: 1 | Status: SHIPPED | OUTPATIENT
Start: 2025-04-16

## 2025-04-16 NOTE — ASSESSMENT & PLAN NOTE
Hypertension is stable and controlled  Continue current treatment regimen.  Blood pressure will be reassessed in 6 months.    Orders:    lisinopril-hydrochlorothiazide (PRINZIDE,ZESTORETIC) 20-25 MG per tablet; Take 1 tablet by mouth Daily.

## 2025-04-16 NOTE — PROGRESS NOTES
Chief Complaint  Carlota Saez presents to [facility] for Annual Exam    Subjective          History of Present Illness    Carlota is here today for preventive exam.   Has received covid vaccine X 2.   UTD Tdap and PNA vaccines.  Declines zoster vaccine.   Last pap smear 5/15/2019 normal.   Last mammogram 12/7/22. Wishes to schedule.   Normal cologuard 7/31/24.   Currently smoking 1 ppd.   Discussed lung cancer screening recommendations with the patient using the Lung Cancer Screening shared decision-making tool, including benefits and risks of a low-dose lung CT scan. The patient has declined screening at this time. Will revisit at future visits as appropriate.    She is also following up on HTN and hyperlipidemia.  She was seen in clinic on 7/10/2024.  She had not been seen at that time since 11/30/2022.  She was restarted on lisinopril/hydrochlorothiazide and atorvastatin. Tolerating well. She is fasting today.     Review of Systems   Constitutional:  Negative for chills and fever.   HENT:  Negative for ear pain and sore throat.    Eyes:  Negative for blurred vision and redness.   Respiratory:  Negative for shortness of breath and wheezing.    Cardiovascular:  Negative for chest pain and palpitations.   Gastrointestinal:  Negative for abdominal pain and vomiting.   Genitourinary:  Negative for frequency and urgency.   Skin:  Negative for rash.   Neurological:  Negative for seizures and syncope.   Psychiatric/Behavioral:  Negative for suicidal ideas and depressed mood.          Allergies   Allergen Reactions    Penicillins Unknown - Low Severity     Childhood       History reviewed. No pertinent past medical history.  Current Outpatient Medications   Medication Sig Dispense Refill    atorvastatin (LIPITOR) 10 MG tablet Take 1 tablet by mouth Daily. 90 tablet 1    lisinopril-hydrochlorothiazide (PRINZIDE,ZESTORETIC) 20-25 MG per tablet Take 1 tablet by mouth Daily. 90 tablet 1  "    No current facility-administered medications for this visit.     Past Surgical History:   Procedure Laterality Date     SECTION        Social History     Tobacco Use    Smoking status: Every Day     Current packs/day: 1.00     Average packs/day: 1 pack/day for 36.3 years (36.3 ttl pk-yrs)     Types: Cigarettes     Start date:      Passive exposure: Never    Smokeless tobacco: Never   Vaping Use    Vaping status: Never Used   Substance Use Topics    Alcohol use: Not Currently    Drug use: Never     Family History   Problem Relation Age of Onset    Lung cancer Mother      Health Maintenance Due   Topic Date Due    PAP SMEAR  05/15/2022    ANNUAL PHYSICAL  2023    LUNG CANCER SCREENING  Never done    MAMMOGRAM  2024      Immunization History   Administered Date(s) Administered    COVID-19 (MODERNA) 1st,2nd,3rd Dose Monovalent 2021, 10/28/2021    Pneumococcal Conjugate 20-Valent (PCV20) 2022    Tdap 2018        Objective     Vitals:    25 1109   BP: 127/58   Pulse: 70   Temp: 98.2 °F (36.8 °C)   TempSrc: Oral   SpO2: 99%   Weight: 133 kg (292 lb 12.8 oz)   Height: 162.6 cm (64\")     Body mass index is 50.26 kg/m².              No results found.    Physical Exam  Vitals reviewed.   Constitutional:       General: She is not in acute distress.     Appearance: Normal appearance. She is well-developed.   HENT:      Head: Normocephalic and atraumatic.      Right Ear: Hearing, tympanic membrane and ear canal normal.      Left Ear: Hearing, tympanic membrane and ear canal normal.      Mouth/Throat:      Mouth: Mucous membranes are moist.   Eyes:      Extraocular Movements: Extraocular movements intact.      Pupils: Pupils are equal, round, and reactive to light.   Cardiovascular:      Rate and Rhythm: Normal rate and regular rhythm.      Pulses: Normal pulses.      Heart sounds: Normal heart sounds.   Pulmonary:      Effort: Pulmonary effort is normal.      Breath sounds: " Normal breath sounds.   Abdominal:      General: Bowel sounds are normal.      Palpations: Abdomen is soft.      Tenderness: There is no abdominal tenderness.   Musculoskeletal:         General: Normal range of motion.      Cervical back: Normal range of motion and neck supple.   Skin:     General: Skin is warm and dry.   Neurological:      Mental Status: She is alert and oriented to person, place, and time.   Psychiatric:         Mood and Affect: Mood normal.           Result Review :                               Assessment and Plan      Assessment & Plan  Annual physical exam  Appropriate screenings and vaccinations were reviewed with the pt and offered as indicated.  Pt counseled on healthy lifestyle including healthy diet, exercise.    Orders:    CBC (No Diff); Future    Lipid Panel; Future    Comprehensive Metabolic Panel; Future    Screening mammogram for breast cancer  Screen mammo  Orders:    Mammo Screening Digital Tomosynthesis Bilateral With CAD; Future    Essential (primary) hypertension  Hypertension is stable and controlled  Continue current treatment regimen.  Blood pressure will be reassessed in 6 months.    Orders:    lisinopril-hydrochlorothiazide (PRINZIDE,ZESTORETIC) 20-25 MG per tablet; Take 1 tablet by mouth Daily.    Mixed hyperlipidemia     Rechecking labs with further recommendations to follow.   Orders:    atorvastatin (LIPITOR) 10 MG tablet; Take 1 tablet by mouth Daily.              Follow Up     Return for well woman exam.

## 2025-04-16 NOTE — ASSESSMENT & PLAN NOTE
Rechecking labs with further recommendations to follow.   Orders:    atorvastatin (LIPITOR) 10 MG tablet; Take 1 tablet by mouth Daily.

## 2025-04-17 ENCOUNTER — RESULTS FOLLOW-UP (OUTPATIENT)
Dept: FAMILY MEDICINE CLINIC | Age: 50
End: 2025-04-17
Payer: COMMERCIAL

## 2025-04-17 NOTE — LETTER
Carlota Saez  [address] [facility] [address]    April 17, 2025     Dear Carlota,     I am pleased to report that your recent labs looking at blood counts, electrolytes, kidneys, and liver looked good.  Your cholesterol has much improved from prior testing. Recommend continued efforts with healthy diet, increase physical activity, and continuing atorvastatin. Please let me know if you have any questions or concerns.       Sincerely,  CARI Martínez   Resulted Orders   CBC (No Diff)   Result Value Ref Range    WBC 6.72 3.40 - 10.80 10*3/mm3    RBC 4.63 3.77 - 5.28 10*6/mm3    Hemoglobin 14.2 12.0 - 15.9 g/dL    Hematocrit 42.9 34.0 - 46.6 %    MCV 92.7 79.0 - 97.0 fL    MCH 30.7 26.6 - 33.0 pg    MCHC 33.1 31.5 - 35.7 g/dL    RDW 13.0 12.3 - 15.4 %    RDW-SD 44.6 37.0 - 54.0 fl    MPV 10.7 6.0 - 12.0 fL    Platelets 273 140 - 450 10*3/mm3   Lipid Panel   Result Value Ref Range    Total Cholesterol 168 0 - 200 mg/dL    Triglycerides 78 0 - 150 mg/dL    HDL Cholesterol 59 40 - 60 mg/dL    LDL Cholesterol  94 0 - 100 mg/dL    VLDL Cholesterol 15 5 - 40 mg/dL    LDL/HDL Ratio 1.58    Comprehensive Metabolic Panel   Result Value Ref Range    Glucose 101 (H) 65 - 99 mg/dL    BUN 13 6 - 20 mg/dL    Creatinine 0.77 0.57 - 1.00 mg/dL    Sodium 138 136 - 145 mmol/L    Potassium 4.1 3.5 - 5.2 mmol/L    Chloride 101 98 - 107 mmol/L    CO2 25.1 22.0 - 29.0 mmol/L    Calcium 9.9 8.6 - 10.5 mg/dL    Total Protein 8.0 6.0 - 8.5 g/dL    Albumin 4.3 3.5 - 5.2 g/dL    ALT (SGPT) 19 1 - 33 U/L    AST (SGOT) 21 1 - 32 U/L    Alkaline Phosphatase 57 39 - 117 U/L    Total Bilirubin 0.7 0.0 - 1.2 mg/dL    Globulin 3.7 gm/dL    A/G Ratio 1.2 g/dL    BUN/Creatinine Ratio 16.9 7.0 - 25.0    Anion Gap 11.9 5.0 - 15.0 mmol/L    eGFR 94.1 >60.0 mL/min/1.73

## 2025-06-04 ENCOUNTER — HOSPITAL ENCOUNTER (OUTPATIENT)
Dept: MAMMOGRAPHY | Facility: HOSPITAL | Age: 50
Discharge: HOME | End: 2025-06-04
Admitting: NURSE PRACTITIONER
Payer: COMMERCIAL

## 2025-06-04 DIAGNOSIS — Z12.31 SCREENING MAMMOGRAM FOR BREAST CANCER: ICD-10-CM

## 2025-06-04 PROCEDURE — 77063 BREAST TOMOSYNTHESIS BI: CPT

## 2025-06-04 PROCEDURE — 77067 SCR MAMMO BI INCL CAD: CPT
